# Patient Record
Sex: MALE | Race: WHITE | NOT HISPANIC OR LATINO | Employment: OTHER | URBAN - METROPOLITAN AREA
[De-identification: names, ages, dates, MRNs, and addresses within clinical notes are randomized per-mention and may not be internally consistent; named-entity substitution may affect disease eponyms.]

---

## 2020-01-10 ENCOUNTER — OFFICE VISIT (OUTPATIENT)
Dept: SURGERY | Facility: CLINIC | Age: 75
End: 2020-01-10
Payer: MEDICARE

## 2020-01-10 VITALS
HEIGHT: 66 IN | HEART RATE: 72 BPM | DIASTOLIC BLOOD PRESSURE: 84 MMHG | BODY MASS INDEX: 28.09 KG/M2 | WEIGHT: 174.81 LBS | SYSTOLIC BLOOD PRESSURE: 157 MMHG

## 2020-01-10 DIAGNOSIS — K62.89 RECTAL OR ANAL PAIN: Primary | ICD-10-CM

## 2020-01-10 PROCEDURE — 1101F PT FALLS ASSESS-DOCD LE1/YR: CPT | Mod: CPTII,S$GLB,, | Performed by: COLON & RECTAL SURGERY

## 2020-01-10 PROCEDURE — 99203 OFFICE O/P NEW LOW 30 MIN: CPT | Mod: 25,S$GLB,, | Performed by: COLON & RECTAL SURGERY

## 2020-01-10 PROCEDURE — 99999 PR PBB SHADOW E&M-NEW PATIENT-LVL III: CPT | Mod: PBBFAC,,, | Performed by: COLON & RECTAL SURGERY

## 2020-01-10 PROCEDURE — 99999 PR PBB SHADOW E&M-NEW PATIENT-LVL III: ICD-10-PCS | Mod: PBBFAC,,, | Performed by: COLON & RECTAL SURGERY

## 2020-01-10 PROCEDURE — 1125F AMNT PAIN NOTED PAIN PRSNT: CPT | Mod: S$GLB,,, | Performed by: COLON & RECTAL SURGERY

## 2020-01-10 PROCEDURE — 1159F MED LIST DOCD IN RCRD: CPT | Mod: S$GLB,,, | Performed by: COLON & RECTAL SURGERY

## 2020-01-10 PROCEDURE — 1101F PR PT FALLS ASSESS DOC 0-1 FALLS W/OUT INJ PAST YR: ICD-10-PCS | Mod: CPTII,S$GLB,, | Performed by: COLON & RECTAL SURGERY

## 2020-01-10 PROCEDURE — 1125F PR PAIN SEVERITY QUANTIFIED, PAIN PRESENT: ICD-10-PCS | Mod: S$GLB,,, | Performed by: COLON & RECTAL SURGERY

## 2020-01-10 PROCEDURE — 1159F PR MEDICATION LIST DOCUMENTED IN MEDICAL RECORD: ICD-10-PCS | Mod: S$GLB,,, | Performed by: COLON & RECTAL SURGERY

## 2020-01-10 PROCEDURE — 99203 PR OFFICE/OUTPT VISIT, NEW, LEVL III, 30-44 MIN: ICD-10-PCS | Mod: 25,S$GLB,, | Performed by: COLON & RECTAL SURGERY

## 2020-01-10 NOTE — PROGRESS NOTES
"CRS Office Visit History and Physical    Referring Md:   Chi Paris Md  [address] [address]    SUBJECTIVE:     Chief Complaint: Anal pain/burning    History of Present Illness:  Patient is a 74 y.o. male who presents for evaluation of anal pain/burning.  He states this started approximately 2 years ago.  He describes the sensation to be as if he "just wiped with sandpaper."  This pain is exacerbated by sitting and relieved by standing.  Additionally, the pain only tends to occur on days that he has bowel movements.    The patient states he has a bowel movement approximately every other day to twice per day.  The BMs are formed and usually log-like.  He does not describe sticky stool.  He typically cleans using a bidet rather than wiping.  If he does wipe he uses a plain wet wipe.  In the past he has used hydrocortisone cream and balneol without relief.    The patient reports no blood nor prolapse of tissue.    He tries to eat a high fiber diet but he does not take any fiber supplement.    The patient was seen by Dr. Ma who has previously evaluated him.  He states he was placed on gabapentin which helped some, but did not entirely relieve his symptoms.    Additionally, he has osteoarthritis and a history of back pain for which he has undergone MRI to evaluate for neurologic etiology of his pain.    Last Colonoscopy: Reported to be normal.  He reports he has colonoscopies approximately every 3 years with his family history of colon cancer.      PMH: Osteoarthritis    PSH: No abdominal nor anorectal surgeries    Review of patient's allergies indicates:   Allergen Reactions    Sulfa (sulfonamide antibiotics)        Current Outpatient Medications on File Prior to Visit   Medication Sig Dispense Refill    allopurinol (ZYLOPRIM) 300 MG tablet Take 300 mg by mouth once daily.      amoxicillin (AMOXIL) 500 MG capsule       atorvastatin (LIPITOR) 40 MG tablet       citric " "acid-potassium citrate (POLYCITRA) 1,100-334 mg/5 mL solution Take by mouth 3 (three) times daily with meals.      diazepam (VALIUM) 2 MG tablet Take 2 mg by mouth every 6 (six) hours as needed.      FLUVIRIN [id number] 45 mcg (15 mcg x 3)/0.5 mL Susp       LEVITRA 20 mg tablet       levothyroxine (SYNTHROID, LEVOTHROID) 175 MCG tablet Take 175 mcg by mouth once daily.      potassium citrate (UROCIT-K) 10 mEq TbSR       rosuvastatin (CRESTOR) 10 MG tablet Take 10 mg by mouth once daily.      TADALAFIL (CIALIS ORAL) Take by mouth.      tamsulosin (FLOMAX) 0.4 mg Cp24 Take 0.4 mg by mouth once daily.      testosterone (AXIRON) 30 mg/1.5 mL /actuation SlPm Place onto the skin.      VIAGRA 100 mg tablet        No current facility-administered medications on file prior to visit.        History reviewed. No pertinent family history.    Social History     Tobacco Use    Smoking status: Never Smoker   Substance Use Topics    Alcohol use: Not on file    Drug use: Not on file        Review of Systems:  ROS:  GENERAL: No fever, chills, fatigability or weight loss.  Integument:  No rashes, redness, icterus  CHEST: Denies ZELAYA, cyanosis, wheezing, cough and sputum production.  CARDIOVASCULAR: Denies chest pain, PND, orthopnea or reduced exercise tolerance.  GI:  Denies abd pain, dysphagia, nausea, vomiting, no hematemesis, no rectal pain  : Denies burning on urination, no hematuria, no bacteriuria  MSK:  No deformities, swelling, joint pain swelling  Neurologic:  No HAs, seizures, weakness, paresthesias, gait problems      OBJECTIVE:     Vital Signs (Most Recent)  BP (!) 157/84 (BP Location: Right arm, Patient Position: Sitting, BP Method: Large (Automatic))   Pulse 72   Ht 5' 6" (1.676 m)   Wt 79.3 kg (174 lb 13.2 oz)   BMI 28.22 kg/m²     Physical Exam:  General: White male in no distress   Skin/ Sclera anicteric  HEENT: anicteric, normocephalic, extraocular movements intact   Neck trachea midline  Chest " symmetric, nl excursions, no retractions  Respiratory: respirations are even and unlabored  COR RRR   Abdomen - inspection   soft NT ND.  no masses, No  Organomegaly    Anorectal:   Inspection: small amount of residual stool on the anoderm.  JOSE:  normal tone, no masses, Tenderness to palpation at approximately the level of the dentate.  No tenderness proximally at the pelvic floor.  Extremities: Warm dry and intact.  NO CCE  Neuro: alert and oriented x 4.  Moves all extremities.         ASSESSMENT/PLAN:   1. Chronic anorectal pain    The patient has atypical chronic anorectal pain without clear etiology. No obvious anatomic abnormality on examination but he does have reproducible pain with palpation distally at approximately the level of the dentate.  His pain is not consistent with spinal related pain.  Potential differential includes skin versus muscle related pain.  Recommend empiric pelvic physical therapy and skin treatment with Calmoseptine and high fiber diet to improve stool character and rectal evacuation.  Residual stool debris on anoderm is suggestive of incomplete rectal evacuation with small amount of leakage throughout the day that may be irritating the skin.      Recommend  - Referral to pelvic physical therapy  - High fiber diet and fiber supplement  - Calmoseptine cream    Jose Guadalupe Serrano MD  Colon and Rectal Surgery Fellow

## 2020-01-10 NOTE — LETTER
January 10, 2020      Chi Paris MD  [address]  [address]  [facility] [address]           [facility]  [address]  [facility] [address]  Phone: [phone]          Patient: Brooks Joya   MR Number: [id number]   YOB: 1945   Date of Visit: 1/10/2020       Dear Dr. Chi Paris:    Thank you for referring Brooks Joya to me for evaluation. Attached you will find relevant portions of my assessment and plan of care.    If you have questions, please do not hesitate to call me. I look forward to following Brooks Joya along with you.    Sincerely,    Kleber Cedeno MD    Enclosure  CC:  No Recipients    If you would like to receive this communication electronically, please contact [email or web address] or [phone] to request more information on [employer] Link access.    For providers and/or their staff who would like to refer a patient to [facility], please contact us through our one-stop-shop provider referral line, [facility], at [phone].    If you feel you have received this communication in error or would no longer like to receive these types of communications, please e-mail [email or web address]

## 2020-02-19 ENCOUNTER — CLINICAL SUPPORT (OUTPATIENT)
Dept: REHABILITATION | Facility: HOSPITAL | Age: 75
End: 2020-02-19
Attending: COLON & RECTAL SURGERY
Payer: MEDICARE

## 2020-02-19 DIAGNOSIS — M62.9 DISORDER OF MUSCLE, LIGAMENT, AND FASCIA: ICD-10-CM

## 2020-02-19 DIAGNOSIS — M24.20 DISORDER OF MUSCLE, LIGAMENT, AND FASCIA: ICD-10-CM

## 2020-02-19 PROCEDURE — 97110 THERAPEUTIC EXERCISES: CPT | Mod: PO

## 2020-02-19 PROCEDURE — 97162 PT EVAL MOD COMPLEX 30 MIN: CPT | Mod: PO

## 2020-02-19 NOTE — PLAN OF CARE
"  OUTPATIENT PHYSICAL THERAPY EVALUATION        Patient: Brooks Velasquez [facility] #:  [id number]    Date of treatment: 02/19/2020   Time in: 10:04  Time out: 11:10  Billable time: 60 minutes  # Visits: 1/6  Auth: exp 3/4/2020  POC expiration: 5/19/2020      HISTORY      Brooks is a 74 y.o. male evaluated on 02/19/2020    Physician:  Kleber Cedeno MD   Diagnosis:   Encounter Diagnosis   Name Primary?    Disorder of muscle, ligament, and fascia       Treatment ordered: Physical Therapy  Medical History: No past medical history on file.   Surgical History: No past surgical history on file.   Medications:   Current Outpatient Medications   Medication Sig    allopurinol (ZYLOPRIM) 300 MG tablet Take 300 mg by mouth once daily.    amoxicillin (AMOXIL) 500 MG capsule     atorvastatin (LIPITOR) 40 MG tablet     citric acid-potassium citrate (POLYCITRA) 1,100-334 mg/5 mL solution Take by mouth 3 (three) times daily with meals.    diazepam (VALIUM) 2 MG tablet Take 2 mg by mouth every 6 (six) hours as needed.    FLUVIRIN [id number] 45 mcg (15 mcg x 3)/0.5 mL Susp     LEVITRA 20 mg tablet     levothyroxine (SYNTHROID, LEVOTHROID) 175 MCG tablet Take 175 mcg by mouth once daily.    potassium citrate (UROCIT-K) 10 mEq TbSR     rosuvastatin (CRESTOR) 10 MG tablet Take 10 mg by mouth once daily.    TADALAFIL (CIALIS ORAL) Take by mouth.    tamsulosin (FLOMAX) 0.4 mg Cp24 Take 0.4 mg by mouth once daily.    testosterone (AXIRON) 30 mg/1.5 mL /actuation SlPm Place onto the skin.    VIAGRA 100 mg tablet      No current facility-administered medications for this visit.        Allergies:   Review of patient's allergies indicates:   Allergen Reactions    Sulfa (sulfonamide antibiotics)         Precautions: universal    Bladder/Bowel History: none per pt      SUBJECTIVE     Date of onset: 3 years ago  History of current complaint: pt reports a history of R hip replacement in 2010; also R "knuckle replacements."  Pt " "reports gradual onset of burning sensation to the anus, mainly after having a BM.  If he doesn't have a BM, he doesn't have a problem.  Symptoms resolve overnight.  The symptoms do not radiate.  He notes that his lower spine is a "mess."  He has a history of back pain radiating down the L leg about 20 years ago.  Reports that his back is stiff.  He plays golf 3-4 days per week.  Also goes to the gym 3 days per week- cross training on recumbant bike and uses machines.  He doesn't do anything to alleviate the stiffness.  Prolonged sitting after BM increases his pain; standing relieves it.  Pain is not happening while he is passing stool- only after, and when he sits afterward.  Last BM was yesterday.    Patient's goals for therapy: to be able to perform ADL's and have BM's without pain.    Pain: Patient reports 0/10, with 0 being the lowest and 10 being the highest.    Activities that cause symptoms: defecating and then sitting    Previous treatment included topicals- he cannot recall what he used.  Tried a lidocaine OTC cream and tried that.      Sexually active? No    Frequency of urination:   Daytime: 4-5 times           Nighttime: 1 (wakes to urge)    Difficulty initiating urine stream: No  Urine stream: strong  Complete emptying: Yes  Bladder leakage: No    Frequency of bowel movements: once a day  Difficulty initiating BM: No  Quality/Shape of BM: Angora Stool Chart 3-4  Colon leakage: none    Types of fluid intake: 3 cups of coffee;  2-3 flavored dunn with electrolytes; iced tea, no soda, occ beer  Diet:TAD  Current exercise:see above    Occupation: Pt is a retired     OBJECTIVE     ORTHO SCREEN  Posture: WNL  Pelvic alignment: no sign of deviations noted in supine    ABDOMINALS  Scarring: none  Diastasis: none   Abdominal strength: Rectus: 3/5     TA: poorly isolated but palpation contraction  Chaperone: declined  Consent signed: Yes      RECTAL PELVIC FLOOR EXAM  EXTERNAL " ASSESSMENT  Anus: WNL  Skin condition: WNL   Scarring: none  Sensation: WNL   Pain:  none  Voluntary contraction: visible lift  Voluntary relaxation: visible drop  Involuntary contraction: visible lift  Bearing down: bulge  Anal [place]: intact  Discharge: none       INTERNAL ASSESSMENT  EAS tone: hypertonic   Impaction: none   Pain: tender areas noted as follows: L OI tender to palpation, and recreated symptoms.  Pressure to R and L levators also painful  Sensation: able to localize pressure appropriately   Muscle Bulk: hypertonus   Muscle Power: 2+/5      Quality of contraction: slow relaxation   Specificity: WNL  Coordination: tends to hold breath during PFM contraction     SEMG EVALUATION: Deferred due to time constraints    TREATMENT      Therapeutic Exercise to develop coordination for 10 minutes including: diaphragmatic breathing    Education: instructed on general anatomy/physiology of urinary/bowel system; discussed plan of care with patient; instructed in benefits/risks of treatment; instructed in alternative methods of treatment; instructed in risks of refusing treatment; patient agreed to treatment plan.     Also educated in: body mechanics and use of cut out cushion (Theraseat) for perineal offloading    ASSESSMENT      This is a 74 y.o. male referred to outpatient physical therapy and presents with a medical diagnosis of rectal pain. Patient will benefit from skilled physical therapy to decrease tension on the nerves of the perineum during sitting and defecation to decrease anal discomfort during ADL's.    Educational/Spiritual/Cultural needs: none  Abuse/Neglect: no signs  Nutritional Status: WDWN   Fall Risk: none    Pt's spiritual, cultural and educational needs considered and pt agreeable to plan of care and goals as stated below:     Medical necessity is demonstrated by the following IMPAIRMENTS/PROBLEMS     History  Co-morbidities and personal factors that may impact the plan of care  Examination  Body Structures and Functions, activity limitations and participation restrictions that may impact the plan of care Clinical Presentation   Decision Making/ Complexity Score   Co-morbidities:   S/p R ELGIN              Personal Factors:    Body Regions/Systems/Functions:    poor knowledge of body mechanics and posture, pelvic floor tenderness and increased tension of the pelvic muscles           Activity limitations:   Barriers to Learning: none  Environmental Barriers: none noted    Participation Restrictions:   Pt cannot sit for long periods after having a BM due to pain.        evolving moderate           PLAN    Frequency: once per 1-2 weeks  Duration: 12 weeks    Long Term Goals: 12 weeks   Pt will report improved ability to tolerate sitting > or = 45 minutes with < or = 2/10 pain for improved ability to complete ADL's.   Pt will report < or = 2/10 pain with urination/defecation 80% of the time.   Pt/family will be independent with HEP for continued self-management of symptoms.    Physical therapy will include: therapeutic exercises, neuromuscular re-education, manual therapy, modalities PRN and patient/family education; dry needling as indicated.      Therapist: Kamryn Rivera, PT, BCB-PMD  2/19/2020

## 2020-02-19 NOTE — PATIENT INSTRUCTIONS
Home Exercise Program: 02/19/2020    DIAPHRAGMATIC BREATHING     The diaphragm is a dome shaped muscle that forms the floor of the rib cage.  The correct use of diaphragmatic breathing can help to quiet brain activity resulting in the relaxation of all the muscles and organs of the body. This is accomplished by slow rhythmic breathing concentrated in the diaphragm muscle rather than the chest.    How to do proper relaxation breathing:     Start by lying on your back or reclining in a chair in a relaxed position. Place one hand on your chest and the other on your abdomen.   Relax your jaw by placing your tongue on the roof of your mouth and keeping your teeth slightly apart.    Take a deep breath in, letting the abdomen expand and rise while you keep your upper chest, neck and shoulders relaxed.    As you breathe out, allow your abdomen and chest to fall. Exhale completely.   It doesn't matter if you breathe in/out through your nose and/or mouth. Do whichever feels comfortable.   Remember to breathe slowly.  Do not force your breathing. Do not hold your breath.   Repeat for 5 minutes every day, especially when you are hurting and sore.          [email or web address] for Theraseat cushion.

## 2020-03-10 ENCOUNTER — CLINICAL SUPPORT (OUTPATIENT)
Dept: REHABILITATION | Facility: HOSPITAL | Age: 75
End: 2020-03-10
Payer: MEDICARE

## 2020-03-10 DIAGNOSIS — M62.9 DISORDER OF MUSCLE, LIGAMENT, AND FASCIA: ICD-10-CM

## 2020-03-10 DIAGNOSIS — M24.20 DISORDER OF MUSCLE, LIGAMENT, AND FASCIA: ICD-10-CM

## 2020-03-10 PROCEDURE — 97110 THERAPEUTIC EXERCISES: CPT

## 2020-03-10 PROCEDURE — 97140 MANUAL THERAPY 1/> REGIONS: CPT

## 2020-03-10 NOTE — PROGRESS NOTES
OUTPATIENT PHYSICAL THERAPY DAILY NOTE       Patient: Brooks Joya   [facility] #:  [id number]    Diagnosis:   Encounter Diagnosis   Name Primary?    Disorder of muscle, ligament, and fascia       Date of treatment: 03/10/2020     Time in: 2:52  Time out: 3:35  Billable time: 40 minutes  Visit #: 2/12  Auth: exp 3/10/2021  POC expiration: 5/19/2020    SUBJECTIVE   Pt reports: that he has been practicing his breathing.    Pain: pt did not quantify pain on this date      OBJECTIVE     Therapeutic Exercise to develop  flexibility and pelvic muscle lengthening for 10 minutes including:   adapted child's pose, supported yoga squat, adapted happy baby pose.  He was instructed to incorporate DB with these poses.    Manual Therapy to develop pelvic muscle lengthening and downtraining for 30 minutes including:ischemic pressure and contract/relax to B levator ani (pt positioned in supine hooklying), MFR to the perineal body, ischemic pressure to B OI's.  OI's were not tender or symptom provoking on this date.  Pt did struggle to keep from shortening the levators but was able to utilize DB to achieve drop when contracted.  Passive shortening of the EAS was also incorporated (with indirect stacking).  Pt was instructed to monitor his symptoms for the next 24 hours and to report back next session.      Education: Discussed progression of plan of care with patient; educated pt in activity modification; reviewed HEP with pt. Pt demonstrated and verbalized understanding of all instruction and was provided with a handout of HEP (see Patient Instructions).      ASSESSMENT      Pt tolerated entire treatment well with no visible adverse effects. Will see what effect today's treatment will have.  He reported leaving the clinic feeling better than when he arrived.    Will the patient continue to benefit from skilled PT intervention? Yes  Medical necessity demonstrated by: Skilled PT supervision required for HEP and treatment  progression  Progress towards goals:  good  New/Revised goals: none      PLAN     Continue with established Plan of Care, working toward established PT goals.

## 2020-03-10 NOTE — PATIENT INSTRUCTIONS
Home Program: 03/10/2020    YOGA POSES   to improve pelvic floor muscle DROP.  Maintain each position for 1 minute, 1-2 times per day.       Deep Squat    Happy Baby            Child's Pose

## 2020-03-19 ENCOUNTER — TELEPHONE (OUTPATIENT)
Dept: REHABILITATION | Facility: HOSPITAL | Age: 75
End: 2020-03-19

## 2020-03-19 NOTE — TELEPHONE ENCOUNTER
Pt was contacted by phone to inform them of [facility] PT being suspended due to Covid-19 concerns.  Message was left with department phone number to call back for home program instructions and with any further questions.

## 2020-03-24 ENCOUNTER — TELEPHONE (OUTPATIENT)
Dept: REHABILITATION | Facility: HOSPITAL | Age: 75
End: 2020-03-24

## 2020-03-24 NOTE — TELEPHONE ENCOUNTER
3/24/2020    Spoke with patient by phone today- he reports continued symptoms with short term relief after last session.  We discussed his home program- I advised him to perform his yoga postures in the evening or after activity rather than in the morning.  We also spoke about valium suppositories and he was advised to consult Dr. Cedeno about these.  Pt was advised to call back with any additional questions in the interim between visits.  Will contact patient when we are able to schedule and provide pelvic PT services.

## 2020-03-27 RX ORDER — GABAPENTIN 300 MG/1
300 CAPSULE ORAL 3 TIMES DAILY
Qty: 90 CAPSULE | Refills: 11 | Status: SHIPPED | OUTPATIENT
Start: 2020-03-27 | End: 2021-04-28

## 2020-04-23 ENCOUNTER — PATIENT MESSAGE (OUTPATIENT)
Dept: REHABILITATION | Facility: HOSPITAL | Age: 75
End: 2020-04-23

## 2020-05-06 ENCOUNTER — CLINICAL SUPPORT (OUTPATIENT)
Dept: REHABILITATION | Facility: HOSPITAL | Age: 75
End: 2020-05-06
Attending: COLON & RECTAL SURGERY
Payer: MEDICARE

## 2020-05-06 DIAGNOSIS — M62.9 DISORDER OF MUSCLE, LIGAMENT, AND FASCIA: ICD-10-CM

## 2020-05-06 DIAGNOSIS — M24.20 DISORDER OF MUSCLE, LIGAMENT, AND FASCIA: ICD-10-CM

## 2020-05-06 PROCEDURE — 97140 MANUAL THERAPY 1/> REGIONS: CPT | Mod: PO

## 2020-05-06 PROCEDURE — 97112 NEUROMUSCULAR REEDUCATION: CPT | Mod: PO

## 2020-05-06 NOTE — PROGRESS NOTES
OUTPATIENT PHYSICAL THERAPY DAILY NOTE       Patient: Brooks Velasquez [facility] #:  [id number]    Diagnosis:   Encounter Diagnosis   Name Primary?    Disorder of muscle, ligament, and fascia       Date of treatment: 05/06/2020     Time in: 10:03  Time out:  11:00  Billable time:55 minutes  Visit #: 3/12  Auth: exp 3/10/2021  POC expiration: 5/19/2020    SUBJECTIVE   Pt reports: pt reports that he is still having rectal pain in sitting after BM.  He does feel like his symptoms are moving in the right direction after last session  Pain: 0/10 at start of session.        OBJECTIVE     Neuromuscular Re-education  To develop muscle coordination and downtraining for 10 minutes as follows: Kegels with assist of SEMG, and bearing down with abdominal distention.  We worked in supine and SL DKTC with external lead wires.  He demonstrated normal baseline resting, good WR rise, and fair/good holding in 10 sec Kegels with complete and timely derecruitment.  He did require verbal cues at first to eliminate gluts and quads.  In bearing down, he was able to bulge the perineum and demonstrated lower pelvic muscle activation during bear down.      Manual Therapy to develop pelvic muscle lengthening and downtraining for 45 minutes including:ischemic pressure and contract/relax to B EAS intrarectally (verbal consent given), MFR to the perineal body, connective tissue mobilization and skin rolling to B buttocks and perineum.  He noted that pressure to the EAS created his sensation of pain located on the L EAS at 2:00 position.    Pt was instructed to monitor his symptoms for the next 24 hours and to report back next session.      Education: Discussed progression of plan of care with patient; educated pt in activity modification; reviewed HEP with pt. Pt demonstrated and verbalized understanding of all instruction and was provided with a handout of HEP (see Patient Instructions).      ASSESSMENT      Pt tolerated entire treatment well  with no visible adverse effects. We left the levators alone today as the EAS was more symptom provocative.  Will the patient continue to benefit from skilled PT intervention? Yes  Medical necessity demonstrated by: Skilled PT supervision required for HEP and treatment progression  Progress towards goals:  good  New/Revised goals: none      PLAN     Continue with established Plan of Care, working toward established PT goals.

## 2020-05-14 ENCOUNTER — CLINICAL SUPPORT (OUTPATIENT)
Dept: REHABILITATION | Facility: HOSPITAL | Age: 75
End: 2020-05-14
Attending: INTERNAL MEDICINE
Payer: MEDICARE

## 2020-05-14 DIAGNOSIS — M62.9 DISORDER OF MUSCLE, LIGAMENT, AND FASCIA: ICD-10-CM

## 2020-05-14 DIAGNOSIS — M24.20 DISORDER OF MUSCLE, LIGAMENT, AND FASCIA: ICD-10-CM

## 2020-05-14 PROCEDURE — 97140 MANUAL THERAPY 1/> REGIONS: CPT | Mod: PO

## 2020-05-14 NOTE — PROGRESS NOTES
OUTPATIENT PHYSICAL THERAPY DAILY NOTE       Patient: Brooks Velasquez [facility] #:  [id number]    Diagnosis:   Encounter Diagnosis   Name Primary?    Disorder of muscle, ligament, and fascia       Date of treatment: 05/14/2020     Time in: 2:00  Time out:  3:00  Billable time:55 minutes  Visit #: 4/12  Auth: exp 3/10/2021  POC expiration: 5/19/2020    SUBJECTIVE   Pt reports: pt reports that he is still having rectal pain in sitting after BM.  No issues if he plays golf after BM.  Pain: 0/10 at start of session.  Last BM was yesterday and he didn't have an issue.  He is still doing his yoga postures.        OBJECTIVE     Manual Therapy to develop pelvic muscle lengthening and downtraining for 55 minutes including:ischemic pressure and contract/relax to B EAS intrarectally (verbal consent given), MFR to the perineal body, connective tissue mobilization and skin rolling to B buttocks and perineum.  We discussed AT LENGTH the fact that increased pelvic muscle tension can refer pain to the rectum and groin, and can be a  of pain after BM.  We spoke about passive shortening of the pelvic floor (slouched sitting), and about activities to perform after BM to eliminate the pain that follows passing stool.  We worked on his awareness of tension in the EAS, and when this was called to his attention he was able to actively lengthen the EAS for short periods.   We also discussed his tendency to hold tension in his abdominals, and holding anal tension for fear of losing bowel control.   Please refer to pt. Instructions for revised home program.     Education: Discussed progression of plan of care with patient; educated pt in activity modification; reviewed HEP with pt. Pt demonstrated and verbalized understanding of all instruction and was provided with a handout of HEP (see Patient Instructions).      ASSESSMENT      Pt tolerated entire treatment well with no visible adverse effects. We seem to have had a serious aha  moment- will hopefully be able to give him some control of his muscle tension, and pain.  Dry needling may be still on the table as well.    Will the patient continue to benefit from skilled PT intervention? Yes  Medical necessity demonstrated by: Skilled PT supervision required for HEP and treatment progression  Progress towards goals:  good  New/Revised goals: none      PLAN     Continue with established Plan of Care, working toward established PT goals.

## 2020-05-14 NOTE — PATIENT INSTRUCTIONS
After BM, if you can't play golf, try these 2 strategies:    1. Cat cow        On hands and knees, exhale and round back up. Inhale and arch back down.  repeat for __10-20__ breaths.  Focus on NOT CLENCHING.     https://yg.[employer].us/46     Copyright © I. All rights reserved.     2. Focus on sitting but NOT CLENCHING.  This involves self awareness, and getting control of both your buttocks and your sphincter muscle.  Remember to LET YOUR BELLY GO TOO!

## 2020-05-21 ENCOUNTER — CLINICAL SUPPORT (OUTPATIENT)
Dept: REHABILITATION | Facility: HOSPITAL | Age: 75
End: 2020-05-21
Attending: COLON & RECTAL SURGERY
Payer: MEDICARE

## 2020-05-21 DIAGNOSIS — M24.20 DISORDER OF MUSCLE, LIGAMENT, AND FASCIA: ICD-10-CM

## 2020-05-21 DIAGNOSIS — M62.9 DISORDER OF MUSCLE, LIGAMENT, AND FASCIA: ICD-10-CM

## 2020-05-21 PROCEDURE — 97140 MANUAL THERAPY 1/> REGIONS: CPT | Mod: PO

## 2020-05-21 NOTE — PATIENT INSTRUCTIONS
"1. Prior to sitting on toilet, spread your "cheeks" to help your muscles open up when you push.    2. Place your feet on a stool so that your knees are above your hips.      3. Take a deep breath in and let belly expand.    4. Breathe out and push anal muscles down and belly out.  Blow out through your mouth to avoid straining.    "

## 2020-05-21 NOTE — PROGRESS NOTES
"  OUTPATIENT PHYSICAL THERAPY DAILY NOTE/ PLAN OF CARE       Patient: Brooks Velasquez [facility] #:  [id number]    Diagnosis:   Encounter Diagnosis   Name Primary?    Disorder of muscle, ligament, and fascia       Date of treatment: 05/21/2020     Time in: 10:02  Time out:  11:00  Billable time:55 minutes  Visit #: 5/12  Auth: exp 3/10/2021  POC expiration: 5/19/2020    SUBJECTIVE   Pt reports: that he is "about the same."  Pain: 0/10 at start of session.  Last BM was this morning.  He is still doing his yoga postures and breathing work.  Trying to release his abdominals more during functional activities.  Continues to have discomfort after BM if not playing golf.        OBJECTIVE     Manual Therapy to develop pelvic muscle lengthening and downtraining for 55 minutes including:ischemic pressure and contract/relax to B EAS intrarectally (verbal consent given), connective tissue mobilization and skin rolling to B buttocks and perineum.  We discussed making changes to how he positions himself and engages muscles to defecate.  He was encouraged to place a stool to improve his anorectal angle during evacuation.   Please refer to pt. Instructions for revised home program.     Pt also rec'd FDN to the EAS provided by Ashley Holstein, PT.  Consents signed prior.        Education: Discussed progression of plan of care with patient; educated pt in activity modification; reviewed HEP with pt. Pt demonstrated and verbalized understanding of all instruction and was provided with a handout of HEP (see Patient Instructions).      ASSESSMENT      Pt tolerated entire treatment well with no visible adverse effects. Will assess the effect of dry needling next session and repeat or progress as indicated.   Will the patient continue to benefit from skilled PT intervention? Yes  Medical necessity demonstrated by: Skilled PT supervision required for HEP and treatment progression  Progress towards goals:  good  New/Revised goals:   STG: (1 " week) 1. Pt to be I with proper body mechanics for defecation.    LTG: (6 weeks) Pt will report improved ability to tolerate sitting > or = 45 minutes with < or = 2/10 pain for improved ability to complete ADL's. PROGRESSING  Pt will report < or = 2/10 pain with urination/defecation 80% of the time. MET  Pt/family will be independent with HEP for continued self-management of symptoms.PROGRESSING      PLAN     PT once per week for 6 weeks.  Physical therapy will include: therapeutic exercises, neuromuscular re-education, manual therapy, modalities PRN and patient/family education; dry needling as indicated

## 2020-05-25 ENCOUNTER — PATIENT MESSAGE (OUTPATIENT)
Dept: REHABILITATION | Facility: HOSPITAL | Age: 75
End: 2020-05-25

## 2020-05-25 NOTE — PROGRESS NOTES
Patient provided written and verbal consent to receive functional dry needling at today's visit (see consent form scanned into chart). FDN performed to external anal sphincter. FDN performed to reduce pain and muscle tension, promote blood flow, and improve ROM and function x 5 minutes (no charge). Pt tolerated tx well without adverse effects. He was educated on what to expect following the procedure and he verbalized understanding.

## 2020-05-28 ENCOUNTER — CLINICAL SUPPORT (OUTPATIENT)
Dept: REHABILITATION | Facility: HOSPITAL | Age: 75
End: 2020-05-28
Attending: COLON & RECTAL SURGERY
Payer: MEDICARE

## 2020-05-28 DIAGNOSIS — M62.9 DISORDER OF MUSCLE, LIGAMENT, AND FASCIA: ICD-10-CM

## 2020-05-28 DIAGNOSIS — M24.20 DISORDER OF MUSCLE, LIGAMENT, AND FASCIA: ICD-10-CM

## 2020-05-28 PROCEDURE — 97140 MANUAL THERAPY 1/> REGIONS: CPT | Mod: PO

## 2020-05-28 NOTE — PROGRESS NOTES
OUTPATIENT PHYSICAL THERAPY DAILY NOTE       Patient: Brooks Velasquez [facility] #:  [id number]    Diagnosis:   Encounter Diagnosis   Name Primary?    Disorder of muscle, ligament, and fascia       Date of treatment: 05/28/2020     Time in: 8:05  Time out: 8:55  Billable time: 45 minutes  Visit #: 6/12  Auth: exp 3/10/2021  POC expiration: 6/21/2020    SUBJECTIVE   Pt reports: pt reports relief of symptoms the day of and the next day after last session.  He thinks that if he could have a BM in the evening before bed he could have more manageable symptoms, as sleep often relieves his discomfort.    Pain: 0/10 at start of session.      OBJECTIVE     Manual Therapy to develop pelvic muscle lengthening and downtraining for 45 minutes including:ischemic pressure and contract/relax to B EAS intrarectally (verbal consent given), connective tissue mobilization and skin rolling to B buttocks and perineum.  We worked on proper diaphragmatic breathing, as he tended to bear down a little when doing this.  He achieved fairly good tissue excursion with just normal deep breathing.       Pt also rec'd FDN to the EAS provided by Ashley Holstein, PT.  Verbal consent given prior.       Education: Discussed progression of plan of care with patient; educated pt in activity modification; reviewed HEP with pt. Pt demonstrated and verbalized understanding of all instruction.    ASSESSMENT      Pt tolerated entire treatment well with no visible adverse effects. Pt reports some relief after needling and manual therapy.   Will the patient continue to benefit from skilled PT intervention? Yes  Medical necessity demonstrated by: Skilled PT supervision required for HEP and treatment progression  Progress towards goals:  good  New/Revised goals:   STG: (1 week) 1. Pt to be I with proper body mechanics for defecation.    LTG: (6 weeks) Pt will report improved ability to tolerate sitting > or = 45 minutes with < or = 2/10 pain for improved ability  to complete ADL's. PROGRESSING  Pt will report < or = 2/10 pain with urination/defecation 80% of the time. MET  Pt/family will be independent with HEP for continued self-management of symptoms.PROGRESSING      PLAN     PT once per week for 6 weeks.  Physical therapy will include: therapeutic exercises, neuromuscular re-education, manual therapy, modalities PRN and patient/family education; dry needling as indicated

## 2020-06-04 ENCOUNTER — CLINICAL SUPPORT (OUTPATIENT)
Dept: REHABILITATION | Facility: HOSPITAL | Age: 75
End: 2020-06-04
Attending: COLON & RECTAL SURGERY
Payer: MEDICARE

## 2020-06-04 DIAGNOSIS — M24.20 DISORDER OF MUSCLE, LIGAMENT, AND FASCIA: ICD-10-CM

## 2020-06-04 DIAGNOSIS — M62.9 DISORDER OF MUSCLE, LIGAMENT, AND FASCIA: ICD-10-CM

## 2020-06-04 PROCEDURE — 97140 MANUAL THERAPY 1/> REGIONS: CPT | Mod: PO

## 2020-06-04 NOTE — PROGRESS NOTES
OUTPATIENT PHYSICAL THERAPY DAILY NOTE       Patient: Brooks Velasquez [facility] #:  [id number]    Diagnosis:   Encounter Diagnosis   Name Primary?    Disorder of muscle, ligament, and fascia       Date of treatment: 06/04/2020     Time in: 10:04  Time out: 10:59  Billable time: 55 minutes  Visit #: 7/12  Auth: exp 3/10/2021  POC expiration: 6/21/2020    SUBJECTIVE   Pt reports:  Pt cannot remember if he had relief after last session.  Pain still tied to BM.  Has not ordered a foot stool to elevate for BM's due to his leaving town next week.     Pain: 3-4/10 at start of session.      OBJECTIVE     Manual Therapy to develop pelvic muscle lengthening and downtraining for 55 minutes including:ischemic pressure and contract/relax to B EAS intrarectally (verbal consent given), connective tissue mobilization along the rectal sidewalls near the obturator internus, connective tissue mobilization and skin rolling to B buttocks and perineum.  B OI's were palpated and noted to be non-tender on this date.   We worked on proper diaphragmatic breathing, as he tended to bear down a little when doing this.  He achieved fairly good tissue excursion with just normal deep breathing.   Symptoms were reproduced mainly with movement of the exam finger, not with palpation.      Education: Discussed progression of plan of care with patient; educated pt in activity modification; reviewed HEP with pt. Pt demonstrated and verbalized understanding of all instruction.    ASSESSMENT      Pt tolerated entire treatment well with no visible adverse effects. Pt may ultimately benefit from a pain management consult.   Will the patient continue to benefit from skilled PT intervention? Yes  Medical necessity demonstrated by: Skilled PT supervision required for HEP and treatment progression  Progress towards goals:  good  New/Revised goals:   STG: (1 week) 1. Pt to be I with proper body mechanics for defecation. MET    LTG: (6 weeks) Pt will report  improved ability to tolerate sitting > or = 45 minutes with < or = 2/10 pain for improved ability to complete ADL's. PROGRESSING  Pt will report < or = 2/10 pain with urination/defecation 80% of the time. MET  Pt/family will be independent with HEP for continued self-management of symptoms.PROGRESSING      PLAN     PT once per week for 6 weeks.  Physical therapy will include: therapeutic exercises, neuromuscular re-education, manual therapy, modalities PRN and patient/family education; dry needling as indicated

## 2020-06-11 ENCOUNTER — CLINICAL SUPPORT (OUTPATIENT)
Dept: REHABILITATION | Facility: HOSPITAL | Age: 75
End: 2020-06-11
Attending: COLON & RECTAL SURGERY
Payer: MEDICARE

## 2020-06-11 DIAGNOSIS — M24.20 DISORDER OF MUSCLE, LIGAMENT, AND FASCIA: ICD-10-CM

## 2020-06-11 DIAGNOSIS — M62.9 DISORDER OF MUSCLE, LIGAMENT, AND FASCIA: ICD-10-CM

## 2020-06-11 PROCEDURE — 97140 MANUAL THERAPY 1/> REGIONS: CPT | Mod: PO

## 2020-06-11 NOTE — PROGRESS NOTES
OUTPATIENT PHYSICAL THERAPY DAILY NOTE       Patient: Brooks Velasquez [facility] #:  [id number]    Diagnosis:   Encounter Diagnosis   Name Primary?    Disorder of muscle, ligament, and fascia       Date of treatment: 06/11/2020     Time in: 10:04  Time out: 10:59  Billable time: 55 minutes  Visit #: 8/12  Auth: exp 3/10/2021  POC expiration: 6/21/2020    SUBJECTIVE   Pt reports:  Pt with no new c/o.       Pain: 0/10 at start of session.      OBJECTIVE     Manual Therapy to develop pelvic muscle lengthening and downtraining for 55 minutes including:ischemic pressure and contract/relax to B EAS intrarectally (verbal consent given), connective tissue mobilization along the rectal sidewalls near the obturator internus.  B OI's were palpated and noted to be non-tender on this date.   We worked on proper diaphragmatic breathing, as he tended to bear down a little when doing this.  We reviewed his home program, including yoga postures for drop, DB, and proper defecation technique.      Education: see above, and pt instructions section    ASSESSMENT      Pt is I with his HEP for self mgmt of symptoms.  No further need for PT services at this time.  We spoke again about a pain mgmt referral and possible nerve blocks as a solution.    Goals:   STG: (1 week) 1. Pt to be I with proper body mechanics for defecation. MET    LTG: (6 weeks) Pt will report improved ability to tolerate sitting > or = 45 minutes with < or = 2/10 pain for improved ability to complete ADL's. NOT MET  Pt will report < or = 2/10 pain with urination/defecation 80% of the time. MET  Pt/family will be independent with HEP for continued self-management of symptoms.MET      PLAN     D/c PT

## 2020-06-11 NOTE — PATIENT INSTRUCTIONS
"Home Exercise Program: 06/11/2020    DIAPHRAGMATIC BREATHING     The diaphragm is a dome shaped muscle that forms the floor of the rib cage.  The correct use of diaphragmatic breathing can help to quiet brain activity resulting in the relaxation of all the muscles and organs of the body. This is accomplished by slow rhythmic breathing concentrated in the diaphragm muscle rather than the chest.    How to do proper relaxation breathing:     Start by lying on your back or reclining in a chair in a relaxed position. Place one hand on your chest and the other on your abdomen.   Relax your jaw by placing your tongue on the roof of your mouth and keeping your teeth slightly apart.    Take a deep breath in, letting the abdomen expand and rise while you keep your upper chest, neck and shoulders relaxed.    As you breathe out, allow your abdomen and chest to fall. Exhale completely.   It doesn't matter if you breathe in/out through your nose and/or mouth. Do whichever feels comfortable.   Remember to breathe slowly.  Do not force your breathing. Do not hold your breath.   Repeat for 5 minutes every day, especially when you are hurting and sore.          [email or web address] for Theraseat cushion.      Home Program: 06/11/2020    YOGA POSES   to improve pelvic floor muscle DROP.  Maintain each position for 1 minute, 1-2 times per day.       Deep Squat    Happy Baby            Child's Pose    After BM, if you can't play golf, try these 2 strategies:    1. Cat cow        On hands and knees, exhale and round back up. Inhale and arch back down.  repeat for __10-20__ breaths.  Focus on NOT CLENCHING.     https://[email or web address].[email or web address]     Copyright © I. All rights reserved.     2. Focus on sitting but NOT CLENCHING.  This involves self awareness, and getting control of both your buttocks and your sphincter muscle.  Remember to LET YOUR BELLY GO TOO!        1. Prior to sitting on toilet, spread your "cheeks" to help your muscles open " up when you push.    2. Place your feet on a stool so that your knees are above your hips.      3. Take a deep breath in and let belly expand.    4. Breathe out and push anal muscles down and belly out.  Blow out through your mouth to avoid straining.

## 2020-10-16 ENCOUNTER — PATIENT MESSAGE (OUTPATIENT)
Dept: REHABILITATION | Facility: HOSPITAL | Age: 75
End: 2020-10-16

## 2020-10-26 ENCOUNTER — PATIENT MESSAGE (OUTPATIENT)
Dept: REHABILITATION | Facility: HOSPITAL | Age: 75
End: 2020-10-26

## 2020-12-11 ENCOUNTER — PATIENT MESSAGE (OUTPATIENT)
Dept: REHABILITATION | Facility: HOSPITAL | Age: 75
End: 2020-12-11

## 2020-12-14 ENCOUNTER — PATIENT MESSAGE (OUTPATIENT)
Dept: REHABILITATION | Facility: HOSPITAL | Age: 75
End: 2020-12-14

## 2020-12-16 ENCOUNTER — CLINICAL SUPPORT (OUTPATIENT)
Dept: REHABILITATION | Facility: HOSPITAL | Age: 75
End: 2020-12-16
Payer: MEDICARE

## 2020-12-16 DIAGNOSIS — H81.91 VESTIBULOPATHY OF RIGHT EAR: ICD-10-CM

## 2020-12-16 DIAGNOSIS — R26.89 IMPAIRMENT OF BALANCE: ICD-10-CM

## 2020-12-16 PROCEDURE — 97162 PT EVAL MOD COMPLEX 30 MIN: CPT | Mod: PO

## 2020-12-16 NOTE — PLAN OF CARE
"[facility]  Physical Therapy Neurological Rehabilitation Initial Evaluation    Name: Brooks Joya  Clinic Number: [id number]    Therapy Diagnosis:   Encounter Diagnoses   Name Primary?    Vestibulopathy of right ear     Impairment of balance      Physician: Hung Puentes MD    Physician Orders: PT Eval and Treat "Vestibular Therapy "  Medical Diagnosis from Referral: R42 (ICD-10-CM) - Dizziness and giddiness H81.4 (ICD-10-CM) - Vertigo of central origin   Evaluation Date: 12/16/2020  Insurance Authorization Period: 12/14/2020 - 12/14/2021   Plan of Care Expiration: 02/15/2021   Visit # / Visits authorized: 01/ 01    Time In: 1015  Time Out: 1110  Total Billable Time: 55 minutes    Precautions: Standard, Bilateral Hearing loss (greater on R side)    Subjective   Date of onset: Summer 2020  History of current condition - Travon reports onset of "wooziness" sensation and imbalance during his vacation time in [place]. He has a longstanding hx of recurrent BPPV, but those episodes usually improve after a few days of taking Valium.   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    Is patient currently taking medication for stated indicators: No  Neurological: None     Medical History:   No past medical history on file.    Surgical History:   Brooks Joya  has no past surgical history on file.    Medications:   Brooks has a current medication list which includes the following prescription(s): " "allopurinol, amoxicillin, atorvastatin, citric acid-potassium citrate, diazepam, fluvirin [id number], gabapentin, levitra, levothyroxine, potassium citrate, rosuvastatin, tadalafil, tamsulosin, testosterone, and viagra.    Allergies:   Review of patient's allergies indicates:   Allergen Reactions    Sulfa (sulfonamide antibiotics)         Imaging, MRI IAC studies: no IAC, CP angle, or intra-labyrinthine masses noted per patient chart from [facility]    Vestibular Function Testing/Audiogram: "Vestibular hypersensitivity and borderline hydropic disease" per patient's chart from [facility]     Prior Therapy: Prior vestibular therapy in [place]  Social History: Lives with spouse   Falls: 0   DME: No    Home Environment: 2 story house   Exercise Routine / History: Golf 3-4 days a week, yard work   Family Present at time of Eval: No   Occupation: Retired  Prior Level of Function: Independent  Current Level of Function: Independent    Pain: L knee pain managed with injections    Pts goals: "reduce or find a way to mitigate wooziness"   Objective   Outcome Measure:   DHI: To be completed    SYSTEMS SCREEN    - Follows commands: 100% of time   - Speech: no deficits     Mental status: alert, oriented to person, place, and time, normal mood, behavior, speech, dress, motor activity, and thought processes  Appearance: Casually dressed  Behavior:  calm and cooperative  Attention Span and Concentration:  Normal    Posture Alignment: WFL     Sensation: Light Touch: Did not formally assess. Patient reports no sensation changes.         Coordination:   - UE coordination: DNT  - LE coordination: DNT    ROM:   CERVICAL SPINE  Flexion: 45 degrees (80-90 deg) (mild symptoms with return to neutral)  Extension: 25 degrees  (70-80 deg)  L side bend: 20 degrees   R side bend: 20 degrees (20-45 degrees)  L rotation: 55 degrees   R rotation: 55 degrees (70-90 degrees)  Are concurrent symptoms present with any of these movements: " "As noted above    if trauma, upper cervical instability testing  Alar Ligament Test: Negative  Sharp Tommie Test: Negative    Modified VAS (Vertebral Artery Screen), in sitting (rotation, then extension):  R: Negative  L: Negative    VESTIBULAR EXAMINATION    Visual/Auditory:   Spontaneous Nystagmus: Absent with fixation present  Ocular ROM: WNL   Tracking/Smooth Pursuits: Intact  Gaze Holding Nystagmus: Present: Right with fixation present  Saccades: Undershooting vertically  Acuity: Wears glasses   Visual field: DNT  Convergence: 15cm  VOR Screen: WNL; slow self selected speed. No retinal slip  VOR Cancellation: WNL    Head Thrust Test: To be assessed  Head Shaking Nystagmus: DNT    POSITIONAL CANAL TESTING: Did not test due to time constraints    Functional Gait Assessment:   1. Gait on level surface =  3  2. Change in Gait Speed = 3  3. Gait with horizontal head turns  = 2  4. Gait with vertical head turns = 2  5. Gait with pivot turns = 3  6. Step over obstacle = 3  7. Gait with Narrow TANK = 3  8. Gait with eyes closed = 2  9. Ambulating Backwards = 3  10. Steps = 1 (step to pattern utilized due to chronic L knee pain)    Score 25/30   Score:   <22/30 fall risk     TOOTIE SENSORY TESTING:  (P= Pass, F= Fail; note any sway; hold each position for 30")  Condition 1: (firm surface/feet together/eyes open) 30  Condition 2: (firm surface/feet together/eyes closed) 30  Condition 3: (firm surface/feet in tandem/eyes open) 30  Condition 4: (firm surface/feet in tandem/eyes closed) 4   Condition 5: (soft surface/feet together/eyes open) 30  Condition 6: (soft surface/feet together/eyes closed) 4  Condition 7: (Fukuda step test), measure distance varied from center starting position, > 30 deg deviation to either side indicates hypofunction of biased side 38 degree deviation to the R    Dynamic Visual Acuity: To be assessed      CMS Impairment/Limitation/Restriction for FOTO Vertigo Survey not performed this date due to " time constraints       TREATMENT   No treatment provided today. All time spent on evaluation.     Home Exercises and Patient Education Provided    Education provided: Examination findings, POC, scheduling, goals of therapy    Written Home Exercises Provided: No. To be established at initial follow up session.     Assessment   Brooks is a 75 y.o. male referred to outpatient Physical Therapy with a medical diagnosis of R42 (ICD-10-CM) - Dizziness and giddiness H81.4 (ICD-10-CM) - Vertigo of central origin.  Objective examination was somewhat limited due to patient's extensive subjective history. Pt presents with signs and symptoms consistent with vestibulopathy, likely of the R side. He exhibited impaired convergence, undershooting with vertical saccades, and mild R-beating nystagmus with R gaze (fixation present). These findings suggest a central component to vestibulopathy. He performed fairly well with dynamic balance testing. With Functional Gait Assessment, he achieved 25/30, exhibiting mild imbalance and reporting increased unsteadiness with the head turning components. With sensory organization static balance testing, he was unable to maintain balance in the test conditions that bias the vestibular system (eyes closed in tandem stance and eyes closed on foam pad). Based on patient's complaints, BPPV is not suspected at this time, but positional testing will be performed at a follow up visit to rule out BPPV. DVA to be performed at a later date as well to objectively assess VOR. Travon will benefit from skilled PT to address stated impairments to decrease dizziness sensation.     Pt prognosis is Good.   Pt will benefit from skilled outpatient Physical Therapy to address the deficits stated above and in the chart below, provide pt/family education, and to maximize pt's level of independence.     Plan of care discussed with patient: Yes   Pt's spiritual, cultural and educational needs considered and patient is  agreeable to the plan of care and goals as stated below:     Anticipated Barriers for therapy: None foreseen    Medical Necessity is demonstrated by the following  History  Co-morbidities and personal factors that may impact the plan of care Co-morbidities:   Bilateral hearing loss with R more involved    Personal Factors:   no deficits     moderate   Examination  Body Structures and Functions, activity limitations and participation restrictions that may impact the plan of care Body Regions:   head  trunk    Body Systems:    balance    Participation Restrictions:   Tolerance to activity    Activity limitations:   Learning and applying knowledge  no deficits    General Tasks and Commands  no deficits    Communication  no deficits    Mobility  walking    Self care  no deficits    Domestic Life  no deficits    Interactions/Relationships  no deficits    Life Areas  no deficits    Community and Social Life  no deficits         moderate   Clinical Presentation evolving clinical presentation with changing clinical characteristics moderate   Decision Making/ Complexity Score: moderate       Short Term Goal = 4 weeks Initial - 12/16/2020 Progress Status   Patient will be independent with HEP emphasizing gaze stabilization and habituation  Education required New goal - Ongoing   Patient will undergo DVA testing with appropriate goal setting to follow To be tested  New goal - Ongoing   Patient will maintain balance in GST condition # 4 for a duration of 10 seconds indicating improved vestibular input/integration for postural control.  4 seconds New goal - Ongoing   Patient will maintain balance in GST condition # 6 for a duration of 15 seconds indicating improved vestibular input/integration for postural control.   4 seconds New goal - Ongoing     Long Term Goal = 8 weeks Initial - 12/16/2020 Progress Status   Patient will exhibit improved Dizziness Handicap Inventory score by 17 points, indicating decreased impact of  dizziness on daily functioning To be given at 1st follow up New goal - Ongoing   Patient will maintain balance in GST condition # 6 for a duration of 30 seconds indicating improved vestibular input/integration for postural control.   4 seconds New goal - Ongoing   Patient will exhibit improved Functional Gait Assessment score to >/= 28 /30 indicating improved dynamic balance for increased safety ambulatory tasks   25/30 New goal - Ongoing       Plan   Plan of care Certification: 12/16/2020 to 02/15/2021.    Outpatient Physical Therapy 2 times weekly for 8 weeks to include the following interventions: Neuromuscular Re-ed, Patient Education, Self Care, Therapeutic Activites, Therapeutic Exercise and Canalith Repositioning Maneuver (as indicated).     Mery Lo, PT

## 2020-12-18 NOTE — PROGRESS NOTES
"  Physical Therapy Daily Treatment Note     Name: Brooks Joya  Clinic Number: [id number]    Therapy Diagnosis:   Encounter Diagnoses   Name Primary?    Vestibulopathy of right ear     Impairment of balance      Physician: Hung Puentes MD    Visit Date: 12/21/2020    Physician Orders: PT Eval and Treat "Vestibular Therapy "  Medical Diagnosis from Referral: R42 (ICD-10-CM) - Dizziness and giddiness H81.4 (ICD-10-CM) - Vertigo of central origin   Evaluation Date: 12/16/2020  Insurance Authorization Period: 12/16/2020 - 12/30/2020    Plan of Care Expiration: 02/15/2021   Visit # / Visits authorized: 02/06     Time In: 0926  Time Out: 1022  Total Billable Time: 56 minutes     Precautions: Standard, Bilateral Hearing loss (greater on R side)    Missed visits: 0  Cancelled visits: 0  Subjective     Pt reports: that "wooziness" sensation is overall improving and he is not having to take the medication as often  HEP initiated today.  Response to previous treatment: No adverse effects reported   Functional change: Ongoing     Pain: 0/10  Location:  N/a    Dizziness: 9/10 "wooziness"     Objective     Travon participated in neuromuscular re-education activities to improve: Balance and Vestibular Function for 45 minutes. The following activities were included:    POSITIONAL CANAL TESTING  Looking for nystagmus (slow phase followed by quick phase to the affected side for BPPV)    [place] (posterior / CL anterior)   Right : Negative nystagmus, Negative dizziness   Left: Negative nystagmus, Negative dizziness  Horizontal Canals   Right: Negative nystagmus, Negative dizziness   Left: Negative nystagmus, Negative dizziness    Treatment Performed: n/a    Head Thrust: Negative  DVA: 6 line loss  DHI: 22 - Mild    Motion Sensitivity Testing     Movement Intensity (0-5) Duration  5 -10s = 1  11 - 30s = 2  >30s = 3 Score  Intensity + duration score   1. Siting to Supine 0/5 0 0   2. Supine to left side 1/5 3 4   3. " Supine to right side 2/5 3 5   4. Supine to sitting 0/5 0 0   5. L [place] 0/5 0 0   6. Up from left 3/5 1 4   7. R [place] 2/5 1 3   8. Up from right 2/5 2 4   9. Sitting, head tipped to L knee 0/5 0 0   10. Head up from left knee 0/5 0 0   11. Sitting, head tipped to R knee 0/5 0 0   12. Head up from right knee 1/5 1 2   13. Sitting head turns (5) 0/5 0 0   14. Sitting head pitches (5) 0/5 0 0   15. In stance, 180 degree turn to L 0/5 0 0   16. In stance, 180 degree turn to R 0/5 0 0     MSQ = Total score x (# of positions with symptoms)/20.48    MSQ = 22 x 6/20.48 = 6.44    Interpretation:   Mild: 0 - 10  Moderate: 11 - 30  Severe: 31 - 100    3 x 30s, VORx1 horizontal, self-selected speed, no retinal slip  3 x 30s, VORx1 vertical, self-selected speed, no retinal slip    2 x 100 feet, Ambulation in hallway, R<>L head turns  2 x 100 feet, Ambulation in hallway, up<>down head turns  2 x 100 feet, Ambulation in hallway, eyes closed, occasional cueing for path maintenance    Home Exercises Provided and Patient Education Provided     Education provided:   - HEP, POC, Proper technique with therapeutic interventions, Benefits/purpose of today's therapeutic interventions    Written Home Exercises Provided: yes.  Exercises were reviewed and Travon was able to demonstrate them prior to the end of the session.  Travon demonstrated good  understanding of the education provided.     See EMR under Patient Instructions for exercises provided 12/21/2020.    Assessment   Travon tolerated today's session well. His symptoms were most provoked with dynamic tasks, though symptoms did occur with VOR training as well. His DHI score of 22 places his in the Mild category for impact dizziness is having on his life. His MSQ score places him in the mild category for motion sensitivity. Six line loss with DVA testing indicates impaired gaze stabilization. Patient's presentation consistent with central dizziness. PT to continue per  POC, including gaze stabilization training, habituation training, and balance training.     Travon is progressing well towards his goals.   Pt prognosis is Good.     Pt will continue to benefit from skilled outpatient physical therapy to address the deficits listed in the problem list box on initial evaluation, provide pt/family education and to maximize pt's level of independence in the home and community environment.     Pt's spiritual, cultural and educational needs considered and pt agreeable to plan of care and goals.     Anticipated barriers to physical therapy: None foreseen     Goals:         Short Term Goal = 4 weeks Initial - 12/16/2020 Progress Status   Patient will be independent with HEP emphasizing gaze stabilization and habituation  Education required Ongoing   Patient will undergo DVA testing with appropriate goal setting to follow To be tested  Ongoing   Patient will maintain balance in GST condition # 4 for a duration of 10 seconds indicating improved vestibular input/integration for postural control.  4 seconds Ongoing   Patient will maintain balance in GST condition # 6 for a duration of 15 seconds indicating improved vestibular input/integration for postural control.    4 seconds Ongoing      Long Term Goal = 8 weeks Initial - 12/16/2020 Progress Status   Patient will exhibit improved Dizziness Handicap Inventory score by 17 points, indicating decreased impact of dizziness on daily functioning To be given at 1st follow up Ongoing   Patient will maintain balance in GST condition # 6 for a duration of 30 seconds indicating improved vestibular input/integration for postural control.    4 seconds Ongoing   Patient will exhibit improved Functional Gait Assessment score to >/= 28 /30 indicating improved dynamic balance for increased safety ambulatory tasks    25/30 Ongoing             Plan     Progress VOR, habituation, and vision-eliminated tasks.     Mery Lo, PT

## 2020-12-21 ENCOUNTER — CLINICAL SUPPORT (OUTPATIENT)
Dept: REHABILITATION | Facility: HOSPITAL | Age: 75
End: 2020-12-21
Payer: MEDICARE

## 2020-12-21 DIAGNOSIS — H81.91 VESTIBULOPATHY OF RIGHT EAR: ICD-10-CM

## 2020-12-21 DIAGNOSIS — R26.89 IMPAIRMENT OF BALANCE: ICD-10-CM

## 2020-12-21 PROCEDURE — 97112 NEUROMUSCULAR REEDUCATION: CPT | Mod: PO

## 2020-12-23 NOTE — PROGRESS NOTES
"  Physical Therapy Daily Treatment Note     Name: Brooks Joya  Clinic Number: [id number]    Therapy Diagnosis:   Encounter Diagnoses   Name Primary?    Vestibulopathy of right ear     Impairment of balance      Physician: Hung Puentes MD    Visit Date: 12/24/2020    Physician Orders: PT Eval and Treat "Vestibular Therapy "  Medical Diagnosis from Referral: R42 (ICD-10-CM) - Dizziness and giddiness H81.4 (ICD-10-CM) - Vertigo of central origin   Evaluation Date: 12/16/2020  Insurance Authorization Period: 12/16/2020 - 12/30/2020    Plan of Care Expiration: 02/15/2021   Visit # / Visits authorized: 03/06     Time In: 1130  Time Out: 1215  Total Billable Time: 45 minutes     Precautions: Standard, Bilateral Hearing loss (greater on R side)    Missed visits: 0  Cancelled visits: 0  Subjective     Pt reports: with questions to clarify HEP. PT spent ~10 minutes of session clarifying home exercises. Otherwise, he reports that he has been feeling a little better, and continues to require less medication (Diazepam) to manage his symptoms.  He was compliant with home exercise program.  Response to previous treatment: No adverse effects reported   Functional change: Ongoing     Pain: 0/10  Location:  N/a    Objective     Travon participated in neuromuscular re-education activities to improve: Balance and Vestibular Function for 45 minutes. The following activities were included:    HEP Review x 10 minutes:  -Clarifed duration of VORx1 to perform at least 12 minutes overall daily. Can be broken up however he can tolerate.   -Clarified to perform alternating head turns while ambulating a straight path.  -Clarified performing 1 set of ea condition with static balance 3x a day    X on White Background used:  3 x 30s, VORx1 horizontal, 100 bpm, mild dizziness, no retinal slip  3 x 30s, VORx1 vertical, 100 bpm, no dizziness, no retinal slip  2 x 30s, VORx1 with Diagonal head turns, increased "wooziness", no retinal " slip    2 x 100 feet, Ambulation in hallway, R<>L head turns  2 x 100 feet, Ambulation in hallway, up<>down head turns  2 x 100 feet, Ambulation in hallway, ea diagonal head turns, VCs to increase magnitude of head movements  2 x 100 feet, Ambulation in hallway, eyes closed, occasional cueing for path maintenance    Airex, feet together, eyes closed 2 x 30s, SBA  Airex, feet together, eyes closed, R<>L head turns 2 x 30s, Occasional CGA  Airex, feet together, eyes closed, up<>down head turns 2 x 30s, Occasional CGA  Airex, feet apart, Standing Head/Trunk rotation L<>R retrieving a green med ball from therapist standing behind patient  x 60s,        Home Exercises Provided and Patient Education Provided     Education provided:   - HEP, POC, Proper technique with therapeutic interventions, Benefits/purpose of today's therapeutic interventions    Written Home Exercises Provided: yes.  Exercises were reviewed and Travon was able to demonstrate them prior to the end of the session.  Travon demonstrated good  understanding of the education provided.     See EMR under Patient Instructions for exercises provided 12/21/2020.    Assessment   Travon tolerated today's session well. Symptoms were most provoked with VOR horizontal and diagonal today. Symptoms were also provoked with diagonal head turns (when cued to increase range of head turns) as well as turning 180 to begin a new lap. He exhibited appropriate challenge with today's static balance interventions. PT to continue per established POC, progressing as tolerated.     Travon is progressing well towards his goals.   Pt prognosis is Good.     Pt will continue to benefit from skilled outpatient physical therapy to address the deficits listed in the problem list box on initial evaluation, provide pt/family education and to maximize pt's level of independence in the home and community environment.     Pt's spiritual, cultural and educational needs considered and pt  agreeable to plan of care and goals.     Anticipated barriers to physical therapy: None foreseen     Goals:         Short Term Goal = 4 weeks Initial - 12/16/2020 Progress Status   Patient will be independent with HEP emphasizing gaze stabilization and habituation  Education required Ongoing   Patient will undergo DVA testing with appropriate goal setting to follow  Updated: Patient will exhibit </= 4 line loss with Dynamic Visual Acuity testing, indicating improved gaze stabilization   6 line loss - 12/21/2020 Ongoing   Patient will maintain balance in GST condition # 4 for a duration of 10 seconds indicating improved vestibular input/integration for postural control.  4 seconds Ongoing   Patient will maintain balance in GST condition # 6 for a duration of 15 seconds indicating improved vestibular input/integration for postural control.    4 seconds Ongoing      Long Term Goal = 8 weeks Initial - 12/16/2020 Progress Status   Patient will exhibit improved Dizziness Handicap Inventory score by 17 points, indicating decreased impact of dizziness on daily functioning 22 - Mild Ongoing   Patient will maintain balance in GST condition # 6 for a duration of 30 seconds indicating improved vestibular input/integration for postural control.    4 seconds Ongoing   Patient will exhibit improved Functional Gait Assessment score to >/= 28 /30 indicating improved dynamic balance for increased safety ambulatory tasks    25/30 Ongoing             Plan     Progress VOR, habituation, and vision-eliminated tasks.     Mery Lo, PT

## 2020-12-24 ENCOUNTER — CLINICAL SUPPORT (OUTPATIENT)
Dept: REHABILITATION | Facility: HOSPITAL | Age: 75
End: 2020-12-24
Payer: MEDICARE

## 2020-12-24 DIAGNOSIS — H81.91 VESTIBULOPATHY OF RIGHT EAR: ICD-10-CM

## 2020-12-24 DIAGNOSIS — R26.89 IMPAIRMENT OF BALANCE: ICD-10-CM

## 2020-12-24 PROCEDURE — 97112 NEUROMUSCULAR REEDUCATION: CPT | Mod: PO

## 2020-12-29 ENCOUNTER — CLINICAL SUPPORT (OUTPATIENT)
Dept: REHABILITATION | Facility: HOSPITAL | Age: 75
End: 2020-12-29
Payer: MEDICARE

## 2020-12-29 DIAGNOSIS — R26.89 IMPAIRMENT OF BALANCE: ICD-10-CM

## 2020-12-29 DIAGNOSIS — H81.91 VESTIBULOPATHY OF RIGHT EAR: ICD-10-CM

## 2020-12-29 PROCEDURE — 97112 NEUROMUSCULAR REEDUCATION: CPT | Mod: PO

## 2020-12-29 NOTE — PROGRESS NOTES
"  Physical Therapy Daily Treatment Note     Name: Brooks Joya  Clinic Number: [id number]    Therapy Diagnosis:   Encounter Diagnoses   Name Primary?    Vestibulopathy of right ear     Impairment of balance      Physician: Hung Puentes MD    Visit Date: 12/29/2020    Physician Orders: PT Eval and Treat "Vestibular Therapy "  Medical Diagnosis from Referral: R42 (ICD-10-CM) - Dizziness and giddiness H81.4 (ICD-10-CM) - Vertigo of central origin   Evaluation Date: 12/16/2020  Insurance Authorization Period: 12/16/2020 - 12/30/2020    Plan of Care Expiration: 02/15/2021   Visit # / Visits authorized: 04/06     Time In: 13:25  Time Out: 14:15  Total Billable Time: 50 minutes     Precautions: Standard, Bilateral Hearing loss (greater on R side)    Missed visits: 0  Cancelled visits: 0  Subjective     Pt reports: that he is doing horrible today. He is still needing to take Valium PRN to help manage his symptoms of wooziness. His symptoms were high yesterday which is the worst they have been in a while. He endorses having an active day but not a stressful day.     He was compliant with home exercise program.  Response to previous treatment: No adverse effects reported   Functional change: Ongoing     Pain: 0/10  Location:  N/a    Objective     Travon participated in neuromuscular re-education activities to improve: Balance and Vestibular Function for 50 minutes. The following activities were included:    Initial 30 min of therapy session spent discussing patient's current symptoms, purpose of vestibular rehab, and typical progression with vestibular rehab. Patient endorses having a terrible day yesterday in which symptoms of wooziness were high and would not relieve. Typically, he can get his symptoms to relieve temporarily with vestibular exercises, but this did not work yesterday. He remains compliant with vestibular exercises and even performs them more than recommended frequency. He endorses feeling " "frustrated that he is working to improve his symptoms but that they have not seemed to change. PT educated patient that vestibular rehab is a slow recovery process and that patient is still in the beginning phase of his current vestibular rehab. He has only completed 4 total PT sessions (inlcuding today's session) in which 1 was his evaluation. Patient also questioned whether he should transfer care to the PT/OT who works with his current ENT (external to [facility]). PT instructed patient that PT could not speak to how other external vestibular programs operate but that it is his decision as to where he would like to seek therapy care. PT reassured patient that the vestibular rehab team at the [facility] location work as a team and perform vestibular rehab with the same treatment approach. PT instructed patient to give vestibular rehab at least 5-6 more sessions to see if noticeable change occurs. If not, then at that point he will need to discuss other treatment options with his MD. In regards to medication management, PT instructed patient to take medication as prescribed by his MD since stopping certain medications abruptly can have adverse effects. If patient feels that medication needs to be adjusted, then he needs to discuss this with his MD.     2 x 100 feet, Ambulation in hallway, R<>L head turns  2 x 100 feet, Ambulation in hallway, up<>down head turns  2 x 100 feet, Ambulation in hallway, ea diagonal head turns, VCs to increase magnitude of head movements  2 x 100 feet, Ambulation in hallway, eyes closed, occasional cueing for path maintenance    X 3 laps, forward <> backward tandem ambulation in // bars with eyes closed, CGA, occ touchdown support  2 x 30" each LE in front, tandem stance, eyes closed, CGA to occ Min A, occ touchdown support    2 point rocker board:  2 x 60" R<>L weight shifting, vision eliminated, Min A, touchdown support   2 x 60" A<>P weight shifting, vision eliminated, Min A, touchdown " "support    X 5 laps figure 8 ambulation around 2 cones spaced ~6 feet apart, SBA      Home Exercises Provided and Patient Education Provided     Education provided:   - HEP, POC, Proper technique with therapeutic interventions, Benefits/purpose of today's therapeutic interventions    Written Home Exercises Provided: yes.  Exercises were reviewed and Travon was able to demonstrate them prior to the end of the session.  Travon demonstrated good  understanding of the education provided.     See EMR under Patient Instructions for exercises provided 12/21/2020.    Assessment   Travon tolerated today's session fair. Majority of session spent discussing current symptoms, patient's frustrations regarding current symptoms, purpose of vestibular rehab, expected progression with vestibular rehab, and current therapy POC. Symptoms of "wooziness" appear to be exacerbated by patient's emotional response to persistence of current symptoms.  Remainder of session focused on motion tolerance and vision eliminated exercises for improved sense with motion.  PT to continue per established POC, progressing as tolerated.     Travon is progressing well towards his goals.   Pt prognosis is Guarded.     Pt will continue to benefit from skilled outpatient physical therapy to address the deficits listed in the problem list box on initial evaluation, provide pt/family education and to maximize pt's level of independence in the home and community environment.     Pt's spiritual, cultural and educational needs considered and pt agreeable to plan of care and goals.     Anticipated barriers to physical therapy: emotional deficits     Goals:         Short Term Goal = 4 weeks Initial - 12/16/2020 Progress Status   Patient will be independent with HEP emphasizing gaze stabilization and habituation  Education required Ongoing   Patient will undergo DVA testing with appropriate goal setting to follow  Updated: Patient will exhibit </= 4 line loss with " Dynamic Visual Acuity testing, indicating improved gaze stabilization   6 line loss - 12/21/2020 Ongoing   Patient will maintain balance in GST condition # 4 for a duration of 10 seconds indicating improved vestibular input/integration for postural control.  4 seconds Ongoing   Patient will maintain balance in GST condition # 6 for a duration of 15 seconds indicating improved vestibular input/integration for postural control.    4 seconds Ongoing      Long Term Goal = 8 weeks Initial - 12/16/2020 Progress Status   Patient will exhibit improved Dizziness Handicap Inventory score by 17 points, indicating decreased impact of dizziness on daily functioning 22 - Mild Ongoing   Patient will maintain balance in GST condition # 6 for a duration of 30 seconds indicating improved vestibular input/integration for postural control.    4 seconds Ongoing   Patient will exhibit improved Functional Gait Assessment score to >/= 28 /30 indicating improved dynamic balance for increased safety ambulatory tasks    25/30 Ongoing             Plan     Progress VOR, habituation, and vision-eliminated tasks.     Briana Erickson, PT

## 2020-12-31 ENCOUNTER — CLINICAL SUPPORT (OUTPATIENT)
Dept: REHABILITATION | Facility: HOSPITAL | Age: 75
End: 2020-12-31
Payer: MEDICARE

## 2020-12-31 DIAGNOSIS — R26.89 IMPAIRMENT OF BALANCE: ICD-10-CM

## 2020-12-31 DIAGNOSIS — H81.91 VESTIBULOPATHY OF RIGHT EAR: ICD-10-CM

## 2020-12-31 PROCEDURE — 97112 NEUROMUSCULAR REEDUCATION: CPT | Mod: PO

## 2020-12-31 NOTE — PROGRESS NOTES
"  Physical Therapy Daily Treatment Note     Name: Brooks Joya  Clinic Number: [id number]    Therapy Diagnosis:   Encounter Diagnoses   Name Primary?    Vestibulopathy of right ear     Impairment of balance      Physician: Hung Puentes MD    Visit Date: 12/31/2020    Physician Orders: PT Eval and Treat "Vestibular Therapy "  Medical Diagnosis from Referral: R42 (ICD-10-CM) - Dizziness and giddiness H81.4 (ICD-10-CM) - Vertigo of central origin   Evaluation Date: 12/16/2020  Insurance Authorization Period: 12/16/2020 - 12/30/2020    Plan of Care Expiration: 02/15/2021   Visit # / Visits authorized: 05/06     Time In: 1130  Time Out: 1215  Total Billable Time: 45 minutes     Precautions: Standard, Bilateral Hearing loss (greater on R side)    Missed visits: 0  Cancelled visits: 0  Subjective     Pt reports: that he has been having some "awful" days. He reports that he feels fine when playing golf and sitting completely still, but increased symptoms with walking and reading the newspaper.    He was compliant with home exercise program.  Response to previous treatment: No adverse effects reported   Functional change: Ongoing     Pain: 0/10  Location:  N/a    Dizziness:   Pre-session 5/10 "Wooziness"   Post-session: 8/10 (patient rest in clinic until symptoms decreased)  Objective     Travon participated in neuromuscular re-education activities to improve: Balance and Vestibular Function for 50 minutes. The following activities were included:    X on White Background used:  3 x 30s, VORx1 horizontal, 110 bpm, mild dizziness, no retinal slip  3 x 30s, VORx1 vertical, 110 bpm, no dizziness, no retinal slip  2 x 30s, VORx1 with Diagonal head turns, increased "wooziness", no retinal slip  2 x 30s, VORx2 horizontal, mild dizziness  2 x 30s, VORx2 vertical, mild dizziness  3 x 30s Smooth pursuits, numbers target, horizontal, no dizziness  3 x 30s Smooth pursuits, numbers target, vertical, no dizziness    2 x 100 " feet, Ambulation in hallway, fwd/bwd, R<>L head turns  2 x 100 feet, Ambulation in hallway, fwd/bwd, up<>down head turns  2 x 100 feet, Ambulation in hallway, fwd/bwd, ea diagonal head turns, VCs to increase magnitude of head movements  2 x 100 feet, Ambulation in hallway, fwd only, eyes closed, moderate imbalance, occasional cueing for path maintenance    Home Exercises Provided and Patient Education Provided     Education provided:   - HEP, POC, Proper technique with therapeutic interventions, Benefits/purpose of today's therapeutic interventions    Written Home Exercises Provided: yes.  Exercises were reviewed and Travon was able to demonstrate them prior to the end of the session.  Travon demonstrated good  understanding of the education provided.     See EMR under Patient Instructions for exercises provided 12/21/2020.    Assessment   Travon tolerated today's session fairly well. He reported mild dizziness with oculomotor and gaze stabilization exercises. Up to moderate-severe dizziness was elicited with dynamic interventions performed today, mostly due to the addition of backwards ambulation today. Therapist reinforced with patient to perform HEP as directed on the handout to avoid over-stimulation leading to regression of symptoms. PT to continue per established POC, progressing as tolerated.      Travon is progressing well towards his goals.   Pt prognosis is Guarded.     Pt will continue to benefit from skilled outpatient physical therapy to address the deficits listed in the problem list box on initial evaluation, provide pt/family education and to maximize pt's level of independence in the home and community environment.     Pt's spiritual, cultural and educational needs considered and pt agreeable to plan of care and goals.     Anticipated barriers to physical therapy: emotional deficits     Goals:         Short Term Goal = 4 weeks Initial - 12/16/2020 Progress Status   Patient will be independent with  HEP emphasizing gaze stabilization and habituation  Education required Ongoing   Patient will undergo DVA testing with appropriate goal setting to follow  Updated: Patient will exhibit </= 4 line loss with Dynamic Visual Acuity testing, indicating improved gaze stabilization   6 line loss - 12/21/2020 Ongoing   Patient will maintain balance in GST condition # 4 for a duration of 10 seconds indicating improved vestibular input/integration for postural control.  4 seconds Ongoing   Patient will maintain balance in GST condition # 6 for a duration of 15 seconds indicating improved vestibular input/integration for postural control.    4 seconds Ongoing      Long Term Goal = 8 weeks Initial - 12/16/2020 Progress Status   Patient will exhibit improved Dizziness Handicap Inventory score by 17 points, indicating decreased impact of dizziness on daily functioning 22 - Mild Ongoing   Patient will maintain balance in GST condition # 6 for a duration of 30 seconds indicating improved vestibular input/integration for postural control.    4 seconds Ongoing   Patient will exhibit improved Functional Gait Assessment score to >/= 28 /30 indicating improved dynamic balance for increased safety ambulatory tasks    25/30 Ongoing             Plan     Progress VOR, habituation, and vision-eliminated tasks.     Mery Lo, PT

## 2021-01-04 ENCOUNTER — CLINICAL SUPPORT (OUTPATIENT)
Dept: REHABILITATION | Facility: HOSPITAL | Age: 76
End: 2021-01-04
Payer: MEDICARE

## 2021-01-04 DIAGNOSIS — H81.91 VESTIBULOPATHY OF RIGHT EAR: ICD-10-CM

## 2021-01-04 DIAGNOSIS — R26.89 IMPAIRMENT OF BALANCE: ICD-10-CM

## 2021-01-04 PROCEDURE — 97112 NEUROMUSCULAR REEDUCATION: CPT | Mod: PO

## 2021-01-07 ENCOUNTER — CLINICAL SUPPORT (OUTPATIENT)
Dept: REHABILITATION | Facility: HOSPITAL | Age: 76
End: 2021-01-07
Payer: MEDICARE

## 2021-01-07 DIAGNOSIS — H81.91 VESTIBULOPATHY OF RIGHT EAR: ICD-10-CM

## 2021-01-07 DIAGNOSIS — R26.89 IMPAIRMENT OF BALANCE: ICD-10-CM

## 2021-01-07 PROCEDURE — 97112 NEUROMUSCULAR REEDUCATION: CPT | Mod: PO

## 2021-02-04 ENCOUNTER — IMMUNIZATION (OUTPATIENT)
Dept: PHARMACY | Facility: CLINIC | Age: 76
End: 2021-02-04
Payer: MEDICARE

## 2021-02-04 DIAGNOSIS — Z23 NEED FOR VACCINATION: Primary | ICD-10-CM

## 2021-03-04 ENCOUNTER — IMMUNIZATION (OUTPATIENT)
Dept: PHARMACY | Facility: CLINIC | Age: 76
End: 2021-03-04
Payer: MEDICARE

## 2021-03-04 DIAGNOSIS — Z23 NEED FOR VACCINATION: Primary | ICD-10-CM

## 2021-03-29 ENCOUNTER — PATIENT MESSAGE (OUTPATIENT)
Dept: INFECTIOUS DISEASES | Facility: CLINIC | Age: 76
End: 2021-03-29

## 2021-04-28 ENCOUNTER — OFFICE VISIT (OUTPATIENT)
Dept: INFECTIOUS DISEASES | Facility: CLINIC | Age: 76
End: 2021-04-28
Payer: MEDICARE

## 2021-04-28 ENCOUNTER — LAB VISIT (OUTPATIENT)
Dept: LAB | Facility: HOSPITAL | Age: 76
End: 2021-04-28
Attending: INTERNAL MEDICINE
Payer: MEDICARE

## 2021-04-28 VITALS
WEIGHT: 167.75 LBS | HEART RATE: 74 BPM | DIASTOLIC BLOOD PRESSURE: 79 MMHG | TEMPERATURE: 98 F | BODY MASS INDEX: 26.96 KG/M2 | SYSTOLIC BLOOD PRESSURE: 129 MMHG | HEIGHT: 66 IN

## 2021-04-28 DIAGNOSIS — R41.82 ALTERED MENTAL STATUS, UNSPECIFIED ALTERED MENTAL STATUS TYPE: ICD-10-CM

## 2021-04-28 DIAGNOSIS — R42 DIZZINESS: Primary | ICD-10-CM

## 2021-04-28 DIAGNOSIS — R42 DIZZINESS: ICD-10-CM

## 2021-04-28 PROCEDURE — 99999 PR PBB SHADOW E&M-EST. PATIENT-LVL III: ICD-10-PCS | Mod: PBBFAC,,, | Performed by: INTERNAL MEDICINE

## 2021-04-28 PROCEDURE — 86757 RICKETTSIA ANTIBODY: CPT | Performed by: INTERNAL MEDICINE

## 2021-04-28 PROCEDURE — 99204 OFFICE O/P NEW MOD 45 MIN: CPT | Mod: S$GLB,,, | Performed by: INTERNAL MEDICINE

## 2021-04-28 PROCEDURE — 1159F MED LIST DOCD IN RCRD: CPT | Mod: S$GLB,,, | Performed by: INTERNAL MEDICINE

## 2021-04-28 PROCEDURE — 1126F AMNT PAIN NOTED NONE PRSNT: CPT | Mod: S$GLB,,, | Performed by: INTERNAL MEDICINE

## 2021-04-28 PROCEDURE — 1126F PR PAIN SEVERITY QUANTIFIED, NO PAIN PRESENT: ICD-10-PCS | Mod: S$GLB,,, | Performed by: INTERNAL MEDICINE

## 2021-04-28 PROCEDURE — 1101F PT FALLS ASSESS-DOCD LE1/YR: CPT | Mod: CPTII,S$GLB,, | Performed by: INTERNAL MEDICINE

## 2021-04-28 PROCEDURE — 1159F PR MEDICATION LIST DOCUMENTED IN MEDICAL RECORD: ICD-10-PCS | Mod: S$GLB,,, | Performed by: INTERNAL MEDICINE

## 2021-04-28 PROCEDURE — 36415 COLL VENOUS BLD VENIPUNCTURE: CPT | Performed by: INTERNAL MEDICINE

## 2021-04-28 PROCEDURE — 1101F PR PT FALLS ASSESS DOC 0-1 FALLS W/OUT INJ PAST YR: ICD-10-PCS | Mod: CPTII,S$GLB,, | Performed by: INTERNAL MEDICINE

## 2021-04-28 PROCEDURE — 3288F PR FALLS RISK ASSESSMENT DOCUMENTED: ICD-10-PCS | Mod: CPTII,S$GLB,, | Performed by: INTERNAL MEDICINE

## 2021-04-28 PROCEDURE — 3288F FALL RISK ASSESSMENT DOCD: CPT | Mod: CPTII,S$GLB,, | Performed by: INTERNAL MEDICINE

## 2021-04-28 PROCEDURE — 99204 PR OFFICE/OUTPT VISIT, NEW, LEVL IV, 45-59 MIN: ICD-10-PCS | Mod: S$GLB,,, | Performed by: INTERNAL MEDICINE

## 2021-04-28 PROCEDURE — 99999 PR PBB SHADOW E&M-EST. PATIENT-LVL III: CPT | Mod: PBBFAC,,, | Performed by: INTERNAL MEDICINE

## 2021-04-30 LAB
RICK SF IGG TITR SER IF: NORMAL {TITER}
RICK SF IGM TITR SER IF: NORMAL {TITER}

## 2021-06-07 ENCOUNTER — TELEPHONE (OUTPATIENT)
Dept: CARDIOLOGY | Facility: CLINIC | Age: 76
End: 2021-06-07

## 2021-06-07 ENCOUNTER — PATIENT MESSAGE (OUTPATIENT)
Dept: CARDIOLOGY | Facility: CLINIC | Age: 76
End: 2021-06-07

## 2021-06-07 DIAGNOSIS — I34.1 MITRAL VALVE PROLAPSE: ICD-10-CM

## 2021-06-07 DIAGNOSIS — R55 SYNCOPE AND COLLAPSE: Primary | ICD-10-CM

## 2021-06-07 DIAGNOSIS — R55 PRE-SYNCOPE: ICD-10-CM

## 2021-06-08 ENCOUNTER — HOSPITAL ENCOUNTER (OUTPATIENT)
Dept: CARDIOLOGY | Facility: HOSPITAL | Age: 76
Discharge: HOME | End: 2021-06-08
Attending: INTERNAL MEDICINE
Payer: MEDICARE

## 2021-06-08 VITALS
DIASTOLIC BLOOD PRESSURE: 86 MMHG | BODY MASS INDEX: 26.84 KG/M2 | HEIGHT: 66 IN | WEIGHT: 167 LBS | SYSTOLIC BLOOD PRESSURE: 140 MMHG

## 2021-06-08 DIAGNOSIS — I34.1 MITRAL VALVE PROLAPSE: ICD-10-CM

## 2021-06-08 LAB
ASCENDING AORTA: 3.18 CM
AV INDEX (PROSTH): 0.7
AV MEAN GRADIENT: 2 MMHG
AV PEAK GRADIENT: 4 MMHG
AV VALVE AREA: 2.76 CM2
AV VELOCITY RATIO: 0.77
BSA FOR ECHO PROCEDURE: 1.88 M2
CV ECHO LV RWT: 0.39 CM
DOP CALC AO PEAK VEL: 1.05 M/S
DOP CALC AO VTI: 20.89 CM
DOP CALC LVOT AREA: 3.9 CM2
DOP CALC LVOT DIAMETER: 2.24 CM
DOP CALC LVOT PEAK VEL: 0.81 M/S
DOP CALC LVOT STROKE VOLUME: 57.59 CM3
DOP CALCLVOT PEAK VEL VTI: 14.62 CM
E WAVE DECELERATION TIME: 236.46 MSEC
E/A RATIO: 0.78
E/E' RATIO: 13.2 M/S
ECHO LV POSTERIOR WALL: 0.77 CM (ref 0.6–1.1)
EJECTION FRACTION: 65 %
FRACTIONAL SHORTENING: 35 % (ref 28–44)
INTERVENTRICULAR SEPTUM: 0.76 CM (ref 0.6–1.1)
LA MAJOR: 3.76 CM
LA MINOR: 5.15 CM
LA WIDTH: 4.02 CM
LEFT ATRIUM SIZE: 3.47 CM
LEFT ATRIUM VOLUME INDEX MOD: 17.3 ML/M2
LEFT ATRIUM VOLUME INDEX: 27.9 ML/M2
LEFT ATRIUM VOLUME MOD: 31.93 CM3
LEFT ATRIUM VOLUME: 51.54 CM3
LEFT INTERNAL DIMENSION IN SYSTOLE: 2.54 CM (ref 2.1–4)
LEFT VENTRICLE DIASTOLIC VOLUME INDEX: 35.85 ML/M2
LEFT VENTRICLE DIASTOLIC VOLUME: 66.32 ML
LEFT VENTRICLE MASS INDEX: 46 G/M2
LEFT VENTRICLE SYSTOLIC VOLUME INDEX: 12.6 ML/M2
LEFT VENTRICLE SYSTOLIC VOLUME: 23.22 ML
LEFT VENTRICULAR INTERNAL DIMENSION IN DIASTOLE: 3.91 CM (ref 3.5–6)
LEFT VENTRICULAR MASS: 84.81 G
LV LATERAL E/E' RATIO: 11 M/S
LV SEPTAL E/E' RATIO: 16.5 M/S
MV A" WAVE DURATION": 15.13 MSEC
MV PEAK A VEL: 0.85 M/S
MV PEAK E VEL: 0.66 M/S
MV STENOSIS PRESSURE HALF TIME: 68.57 MS
MV VALVE AREA P 1/2 METHOD: 3.21 CM2
PISA TR MAX VEL: 2.4 M/S
PULM VEIN S/D RATIO: 0.67
PV PEAK D VEL: 0.43 M/S
PV PEAK S VEL: 0.29 M/S
RA MAJOR: 3.55 CM
RA PRESSURE: 3 MMHG
RA WIDTH: 3.8 CM
RIGHT VENTRICULAR END-DIASTOLIC DIMENSION: 4.4 CM
RV TISSUE DOPPLER FREE WALL SYSTOLIC VELOCITY 1 (APICAL 4 CHAMBER VIEW): 12.92 CM/S
SINUS: 3.22 CM
STJ: 2.88 CM
TDI LATERAL: 0.06 M/S
TDI SEPTAL: 0.04 M/S
TDI: 0.05 M/S
TR MAX PG: 23 MMHG
TRICUSPID ANNULAR PLANE SYSTOLIC EXCURSION: 2.22 CM
TV REST PULMONARY ARTERY PRESSURE: 26 MMHG

## 2021-06-08 PROCEDURE — 93306 TTE W/DOPPLER COMPLETE: CPT

## 2021-06-08 PROCEDURE — 93306 ECHO (CUPID ONLY): ICD-10-PCS | Mod: 26,,, | Performed by: INTERNAL MEDICINE

## 2021-06-08 PROCEDURE — 93306 TTE W/DOPPLER COMPLETE: CPT | Mod: 26,,, | Performed by: INTERNAL MEDICINE

## 2021-06-09 ENCOUNTER — PATIENT MESSAGE (OUTPATIENT)
Dept: CARDIOLOGY | Facility: CLINIC | Age: 76
End: 2021-06-09

## 2021-06-11 ENCOUNTER — HOSPITAL ENCOUNTER (OUTPATIENT)
Dept: CARDIOLOGY | Facility: HOSPITAL | Age: 76
Discharge: HOME | End: 2021-06-11
Attending: INTERNAL MEDICINE
Payer: MEDICARE

## 2021-06-11 DIAGNOSIS — R55 PRE-SYNCOPE: ICD-10-CM

## 2021-06-11 PROCEDURE — 93227 HOLTER MONITOR - 48 HOUR (CUPID ONLY): ICD-10-PCS | Mod: ,,, | Performed by: INTERNAL MEDICINE

## 2021-06-11 PROCEDURE — 93227 XTRNL ECG REC<48 HR R&I: CPT | Mod: ,,, | Performed by: INTERNAL MEDICINE

## 2021-06-11 PROCEDURE — 93225 XTRNL ECG REC<48 HRS REC: CPT

## 2021-06-15 LAB
OHS CV EVENT MONITOR DAY: 0
OHS CV HOLTER LENGTH DECIMAL HOURS: 48
OHS CV HOLTER LENGTH HOURS: 48
OHS CV HOLTER LENGTH MINUTES: 0

## 2021-06-16 ENCOUNTER — PATIENT MESSAGE (OUTPATIENT)
Dept: CARDIOLOGY | Facility: CLINIC | Age: 76
End: 2021-06-16

## 2021-09-13 ENCOUNTER — PATIENT MESSAGE (OUTPATIENT)
Dept: CARDIOLOGY | Facility: CLINIC | Age: 76
End: 2021-09-13

## 2021-09-13 DIAGNOSIS — I49.8 OTHER SPECIFIED CARDIAC ARRHYTHMIAS: Primary | ICD-10-CM

## 2021-09-17 ENCOUNTER — PATIENT MESSAGE (OUTPATIENT)
Dept: RESEARCH | Facility: HOSPITAL | Age: 76
End: 2021-09-17

## 2021-09-19 PROBLEM — I47.19 ATRIAL TACHYCARDIA: Status: ACTIVE | Noted: 2021-09-19

## 2021-09-19 PROBLEM — G90.9 AUTONOMIC NEUROPATHY: Status: ACTIVE | Noted: 2021-09-19

## 2021-09-20 ENCOUNTER — OFFICE VISIT (OUTPATIENT)
Dept: CARDIOLOGY | Facility: CLINIC | Age: 76
End: 2021-09-20
Payer: MEDICARE

## 2021-09-20 ENCOUNTER — HOSPITAL ENCOUNTER (OUTPATIENT)
Dept: CARDIOLOGY | Facility: CLINIC | Age: 76
Discharge: HOME | End: 2021-09-20
Payer: MEDICARE

## 2021-09-20 VITALS
SYSTOLIC BLOOD PRESSURE: 159 MMHG | DIASTOLIC BLOOD PRESSURE: 88 MMHG | BODY MASS INDEX: 28.74 KG/M2 | HEART RATE: 67 BPM | HEIGHT: 66 IN | WEIGHT: 178.81 LBS

## 2021-09-20 DIAGNOSIS — I47.19 ATRIAL TACHYCARDIA: ICD-10-CM

## 2021-09-20 DIAGNOSIS — G90.9 AUTONOMIC NEUROPATHY: ICD-10-CM

## 2021-09-20 DIAGNOSIS — I25.84 CORONARY ARTERY DISEASE DUE TO CALCIFIED CORONARY LESION: ICD-10-CM

## 2021-09-20 DIAGNOSIS — I25.10 CORONARY ARTERY DISEASE DUE TO CALCIFIED CORONARY LESION: ICD-10-CM

## 2021-09-20 DIAGNOSIS — Z13.6 ENCOUNTER FOR SCREENING FOR CARDIOVASCULAR DISORDERS: ICD-10-CM

## 2021-09-20 DIAGNOSIS — E78.5 DYSLIPIDEMIA: Primary | ICD-10-CM

## 2021-09-20 DIAGNOSIS — M19.90 ARTHRITIS: ICD-10-CM

## 2021-09-20 DIAGNOSIS — I49.8 OTHER SPECIFIED CARDIAC ARRHYTHMIAS: ICD-10-CM

## 2021-09-20 PROCEDURE — 1100F PTFALLS ASSESS-DOCD GE2>/YR: CPT | Mod: CPTII,S$GLB,, | Performed by: INTERNAL MEDICINE

## 2021-09-20 PROCEDURE — 3077F PR MOST RECENT SYSTOLIC BLOOD PRESSURE >= 140 MM HG: ICD-10-PCS | Mod: CPTII,S$GLB,, | Performed by: INTERNAL MEDICINE

## 2021-09-20 PROCEDURE — 3077F SYST BP >= 140 MM HG: CPT | Mod: CPTII,S$GLB,, | Performed by: INTERNAL MEDICINE

## 2021-09-20 PROCEDURE — 99999 PR PBB SHADOW E&M-EST. PATIENT-LVL IV: ICD-10-PCS | Mod: PBBFAC,,, | Performed by: INTERNAL MEDICINE

## 2021-09-20 PROCEDURE — 1100F PR PT FALLS ASSESS DOC 2+ FALLS/FALL W/INJURY/YR: ICD-10-PCS | Mod: CPTII,S$GLB,, | Performed by: INTERNAL MEDICINE

## 2021-09-20 PROCEDURE — 1159F MED LIST DOCD IN RCRD: CPT | Mod: CPTII,S$GLB,, | Performed by: INTERNAL MEDICINE

## 2021-09-20 PROCEDURE — 1160F PR REVIEW ALL MEDS BY PRESCRIBER/CLIN PHARMACIST DOCUMENTED: ICD-10-PCS | Mod: CPTII,S$GLB,, | Performed by: INTERNAL MEDICINE

## 2021-09-20 PROCEDURE — 99999 PR PBB SHADOW E&M-EST. PATIENT-LVL IV: CPT | Mod: PBBFAC,,, | Performed by: INTERNAL MEDICINE

## 2021-09-20 PROCEDURE — 93000 ELECTROCARDIOGRAM COMPLETE: CPT | Mod: S$GLB,,, | Performed by: INTERNAL MEDICINE

## 2021-09-20 PROCEDURE — 1159F PR MEDICATION LIST DOCUMENTED IN MEDICAL RECORD: ICD-10-PCS | Mod: CPTII,S$GLB,, | Performed by: INTERNAL MEDICINE

## 2021-09-20 PROCEDURE — 1126F AMNT PAIN NOTED NONE PRSNT: CPT | Mod: CPTII,S$GLB,, | Performed by: INTERNAL MEDICINE

## 2021-09-20 PROCEDURE — 3288F FALL RISK ASSESSMENT DOCD: CPT | Mod: CPTII,S$GLB,, | Performed by: INTERNAL MEDICINE

## 2021-09-20 PROCEDURE — 3288F PR FALLS RISK ASSESSMENT DOCUMENTED: ICD-10-PCS | Mod: CPTII,S$GLB,, | Performed by: INTERNAL MEDICINE

## 2021-09-20 PROCEDURE — 99204 OFFICE O/P NEW MOD 45 MIN: CPT | Mod: S$GLB,,, | Performed by: INTERNAL MEDICINE

## 2021-09-20 PROCEDURE — 3079F PR MOST RECENT DIASTOLIC BLOOD PRESSURE 80-89 MM HG: ICD-10-PCS | Mod: CPTII,S$GLB,, | Performed by: INTERNAL MEDICINE

## 2021-09-20 PROCEDURE — 3079F DIAST BP 80-89 MM HG: CPT | Mod: CPTII,S$GLB,, | Performed by: INTERNAL MEDICINE

## 2021-09-20 PROCEDURE — 99204 PR OFFICE/OUTPT VISIT, NEW, LEVL IV, 45-59 MIN: ICD-10-PCS | Mod: S$GLB,,, | Performed by: INTERNAL MEDICINE

## 2021-09-20 PROCEDURE — 1126F PR PAIN SEVERITY QUANTIFIED, NO PAIN PRESENT: ICD-10-PCS | Mod: CPTII,S$GLB,, | Performed by: INTERNAL MEDICINE

## 2021-09-20 PROCEDURE — 93000 EKG 12-LEAD: ICD-10-PCS | Mod: S$GLB,,, | Performed by: INTERNAL MEDICINE

## 2021-09-20 PROCEDURE — 1160F RVW MEDS BY RX/DR IN RCRD: CPT | Mod: CPTII,S$GLB,, | Performed by: INTERNAL MEDICINE

## 2021-09-20 RX ORDER — MELOXICAM 15 MG/1
15 TABLET ORAL
COMMUNITY
Start: 2021-06-01 | End: 2021-09-20 | Stop reason: ALTCHOICE

## 2021-09-20 RX ORDER — DESVENLAFAXINE 100 MG/1
100 TABLET, EXTENDED RELEASE ORAL
COMMUNITY
Start: 2021-03-05

## 2021-09-20 RX ORDER — CELECOXIB 200 MG/1
200 CAPSULE ORAL 2 TIMES DAILY
Qty: 60 CAPSULE | Refills: 12 | Status: SHIPPED | OUTPATIENT
Start: 2021-09-20 | End: 2021-10-04 | Stop reason: SDUPTHER

## 2021-09-20 RX ORDER — LEVOTHYROXINE SODIUM 150 UG/1
150 TABLET ORAL EVERY MORNING
COMMUNITY
Start: 2021-08-17

## 2021-09-20 RX ORDER — CLONAZEPAM 0.5 MG/1
1 TABLET ORAL DAILY
COMMUNITY
Start: 2021-01-07

## 2021-09-20 RX ORDER — MELATONIN 3 MG
CAPSULE ORAL
COMMUNITY

## 2021-09-20 RX ORDER — ASPIRIN 81 MG/1
81 TABLET ORAL DAILY
Qty: 30 TABLET | Refills: 0 | Status: SHIPPED | OUTPATIENT
Start: 2021-09-20 | End: 2021-09-24

## 2021-09-22 ENCOUNTER — HOSPITAL ENCOUNTER (OUTPATIENT)
Dept: RADIOLOGY | Facility: HOSPITAL | Age: 76
Discharge: HOME | End: 2021-09-22
Attending: INTERNAL MEDICINE
Payer: MEDICARE

## 2021-09-22 DIAGNOSIS — E78.5 DYSLIPIDEMIA: ICD-10-CM

## 2021-09-22 DIAGNOSIS — Z13.6 ENCOUNTER FOR SCREENING FOR CARDIOVASCULAR DISORDERS: ICD-10-CM

## 2021-09-22 DIAGNOSIS — I25.84 CORONARY ARTERY DISEASE DUE TO CALCIFIED CORONARY LESION: ICD-10-CM

## 2021-09-22 DIAGNOSIS — I25.10 CORONARY ARTERY DISEASE DUE TO CALCIFIED CORONARY LESION: ICD-10-CM

## 2021-09-22 PROCEDURE — 75571 CT HRT W/O DYE W/CA TEST: CPT | Mod: 26,,, | Performed by: INTERNAL MEDICINE

## 2021-09-22 PROCEDURE — 75571 CT CALCIUM SCORING CARDIAC: ICD-10-PCS | Mod: 26,,, | Performed by: INTERNAL MEDICINE

## 2021-09-22 PROCEDURE — 75571 CT HRT W/O DYE W/CA TEST: CPT | Mod: TC

## 2021-09-23 PROBLEM — R91.8 PULMONARY NODULES/LESIONS, MULTIPLE: Status: ACTIVE | Noted: 2021-09-23

## 2021-09-23 PROBLEM — R93.1 AGATSTON CAC SCORE, >400: Status: ACTIVE | Noted: 2021-09-23

## 2021-09-24 ENCOUNTER — PATIENT MESSAGE (OUTPATIENT)
Dept: CARDIOLOGY | Facility: CLINIC | Age: 76
End: 2021-09-24

## 2021-09-28 ENCOUNTER — PATIENT MESSAGE (OUTPATIENT)
Dept: CARDIOLOGY | Facility: CLINIC | Age: 76
End: 2021-09-28

## 2021-09-28 ENCOUNTER — TELEPHONE (OUTPATIENT)
Dept: CARDIOLOGY | Facility: CLINIC | Age: 76
End: 2021-09-28

## 2021-09-29 ENCOUNTER — TELEPHONE (OUTPATIENT)
Dept: CARDIOLOGY | Facility: CLINIC | Age: 76
End: 2021-09-29

## 2021-09-29 DIAGNOSIS — R93.1 ELEVATED CORONARY ARTERY CALCIUM SCORE: ICD-10-CM

## 2021-09-29 DIAGNOSIS — I10 HYPERTENSION, UNSPECIFIED TYPE: Primary | ICD-10-CM

## 2021-09-30 ENCOUNTER — PATIENT MESSAGE (OUTPATIENT)
Dept: CARDIOLOGY | Facility: CLINIC | Age: 76
End: 2021-09-30

## 2021-10-01 RX ORDER — ACETAMINOPHEN 160 MG/5ML
200 SUSPENSION, ORAL (FINAL DOSE FORM) ORAL DAILY
COMMUNITY

## 2021-10-01 RX ORDER — RAMIPRIL 5 MG/1
5 CAPSULE ORAL 2 TIMES DAILY
COMMUNITY
End: 2021-12-06 | Stop reason: SDUPTHER

## 2021-10-01 RX ORDER — ACETAMINOPHEN 500 MG
1 TABLET ORAL DAILY
COMMUNITY

## 2021-10-02 ENCOUNTER — PATIENT MESSAGE (OUTPATIENT)
Dept: CARDIOLOGY | Facility: CLINIC | Age: 76
End: 2021-10-02

## 2021-10-04 DIAGNOSIS — M19.90 ARTHRITIS: ICD-10-CM

## 2021-10-04 RX ORDER — CELECOXIB 200 MG/1
200 CAPSULE ORAL 2 TIMES DAILY
Qty: 180 CAPSULE | Refills: 3 | Status: SHIPPED | OUTPATIENT
Start: 2021-10-04

## 2021-10-04 RX ORDER — CELECOXIB 200 MG/1
200 CAPSULE ORAL 2 TIMES DAILY
Qty: 60 CAPSULE | Refills: 12 | Status: CANCELLED | OUTPATIENT
Start: 2021-10-04

## 2021-10-14 ENCOUNTER — HOSPITAL ENCOUNTER (OUTPATIENT)
Dept: CARDIOLOGY | Facility: HOSPITAL | Age: 76
Discharge: HOME | End: 2021-10-14
Attending: INTERNAL MEDICINE
Payer: MEDICARE

## 2021-10-14 VITALS — WEIGHT: 175 LBS | HEIGHT: 66 IN | BODY MASS INDEX: 28.12 KG/M2

## 2021-10-14 DIAGNOSIS — I10 HYPERTENSION, UNSPECIFIED TYPE: ICD-10-CM

## 2021-10-14 DIAGNOSIS — R93.1 ELEVATED CORONARY ARTERY CALCIUM SCORE: ICD-10-CM

## 2021-10-14 LAB
ASCENDING AORTA: 3.02 CM
BSA FOR ECHO PROCEDURE: 1.92 M2
CV ECHO LV RWT: 0.45 CM
CV STRESS BASE HR: 62 BPM
DIASTOLIC BLOOD PRESSURE: 75 MMHG
DOP CALC LVOT AREA: 3.3 CM2
DOP CALC LVOT DIAMETER: 2.06 CM
DOP CALC LVOT PEAK VEL: 1.15 M/S
DOP CALC LVOT STROKE VOLUME: 78.78 CM3
DOP CALCLVOT PEAK VEL VTI: 23.65 CM
E WAVE DECELERATION TIME: 308.53 MSEC
E/A RATIO: 0.6
E/E' RATIO: 8 M/S
ECHO LV POSTERIOR WALL: 0.87 CM (ref 0.6–1.1)
EJECTION FRACTION: 65 %
FRACTIONAL SHORTENING: 31 % (ref 28–44)
INTERVENTRICULAR SEPTUM: 0.82 CM (ref 0.6–1.1)
LA MAJOR: 4.13 CM
LA MINOR: 4.45 CM
LA WIDTH: 3.98 CM
LEFT ATRIUM SIZE: 3.63 CM
LEFT ATRIUM VOLUME INDEX MOD: 13.9 ML/M2
LEFT ATRIUM VOLUME INDEX: 27.8 ML/M2
LEFT ATRIUM VOLUME MOD: 26.23 CM3
LEFT ATRIUM VOLUME: 52.61 CM3
LEFT INTERNAL DIMENSION IN SYSTOLE: 2.68 CM (ref 2.1–4)
LEFT VENTRICLE DIASTOLIC VOLUME INDEX: 34.07 ML/M2
LEFT VENTRICLE DIASTOLIC VOLUME: 64.4 ML
LEFT VENTRICLE MASS INDEX: 50 G/M2
LEFT VENTRICLE SYSTOLIC VOLUME INDEX: 14 ML/M2
LEFT VENTRICLE SYSTOLIC VOLUME: 26.51 ML
LEFT VENTRICULAR INTERNAL DIMENSION IN DIASTOLE: 3.86 CM (ref 3.5–6)
LEFT VENTRICULAR MASS: 94.98 G
LV LATERAL E/E' RATIO: 7 M/S
LV SEPTAL E/E' RATIO: 9.33 M/S
MV A" WAVE DURATION": 10.28 MSEC
MV PEAK A VEL: 0.94 M/S
MV PEAK E VEL: 0.56 M/S
MV STENOSIS PRESSURE HALF TIME: 89.47 MS
MV VALVE AREA P 1/2 METHOD: 2.46 CM2
OHS CV CPX 1 MINUTE RECOVERY HEART RATE: 121 BPM
OHS CV CPX 85 PERCENT MAX PREDICTED HEART RATE MALE: 122
OHS CV CPX ESTIMATED METS: 11
OHS CV CPX MAX PREDICTED HEART RATE: 144
OHS CV CPX PATIENT IS FEMALE: 0
OHS CV CPX PATIENT IS MALE: 1
OHS CV CPX PEAK DIASTOLIC BLOOD PRESSURE: 65 MMHG
OHS CV CPX PEAK HEAR RATE: 139 BPM
OHS CV CPX PEAK RATE PRESSURE PRODUCT: NORMAL
OHS CV CPX PEAK SYSTOLIC BLOOD PRESSURE: 188 MMHG
OHS CV CPX PERCENT MAX PREDICTED HEART RATE ACHIEVED: 97
OHS CV CPX RATE PRESSURE PRODUCT PRESENTING: 8494
PISA TR MAX VEL: 2.2 M/S
PULM VEIN S/D RATIO: 1.58
PV PEAK D VEL: 0.31 M/S
PV PEAK S VEL: 0.49 M/S
RA MAJOR: 4.02 CM
RA PRESSURE: 3 MMHG
RA WIDTH: 2.85 CM
RIGHT VENTRICULAR END-DIASTOLIC DIMENSION: 2.32 CM
RV TISSUE DOPPLER FREE WALL SYSTOLIC VELOCITY 1 (APICAL 4 CHAMBER VIEW): 10.34 CM/S
SINUS: 3.09 CM
STJ: 3.24 CM
STRESS ECHO POST EXERCISE DUR MIN: 6 MINUTES
STRESS ECHO POST EXERCISE DUR SEC: 51 SECONDS
SYSTOLIC BLOOD PRESSURE: 137 MMHG
TDI LATERAL: 0.08 M/S
TDI SEPTAL: 0.06 M/S
TDI: 0.07 M/S
TR MAX PG: 19 MMHG
TRICUSPID ANNULAR PLANE SYSTOLIC EXCURSION: 1.92 CM
TV REST PULMONARY ARTERY PRESSURE: 22 MMHG

## 2021-10-14 PROCEDURE — 93351 STRESS TTE COMPLETE: CPT

## 2021-10-14 PROCEDURE — 93351 STRESS ECHO (CUPID ONLY): ICD-10-PCS | Mod: 26,,, | Performed by: INTERNAL MEDICINE

## 2021-10-14 PROCEDURE — 93351 STRESS TTE COMPLETE: CPT | Mod: 26,,, | Performed by: INTERNAL MEDICINE

## 2021-11-18 ENCOUNTER — PATIENT MESSAGE (OUTPATIENT)
Dept: CARDIOLOGY | Facility: CLINIC | Age: 76
End: 2021-11-18
Payer: MEDICARE

## 2021-11-26 ENCOUNTER — PATIENT MESSAGE (OUTPATIENT)
Dept: CARDIOLOGY | Facility: CLINIC | Age: 76
End: 2021-11-26
Payer: MEDICARE

## 2021-11-30 ENCOUNTER — PATIENT MESSAGE (OUTPATIENT)
Dept: CARDIOLOGY | Facility: CLINIC | Age: 76
End: 2021-11-30
Payer: MEDICARE

## 2021-12-01 RX ORDER — AMLODIPINE BESYLATE 5 MG/1
5 TABLET ORAL NIGHTLY
Qty: 30 TABLET | Refills: 11 | Status: SHIPPED | OUTPATIENT
Start: 2021-12-01 | End: 2021-12-20 | Stop reason: SDUPTHER

## 2021-12-01 RX ORDER — AMLODIPINE BESYLATE 5 MG/1
5 TABLET ORAL NIGHTLY
COMMUNITY
End: 2021-12-01 | Stop reason: SDUPTHER

## 2021-12-05 ENCOUNTER — PATIENT MESSAGE (OUTPATIENT)
Dept: CARDIOLOGY | Facility: CLINIC | Age: 76
End: 2021-12-05
Payer: MEDICARE

## 2021-12-06 RX ORDER — RAMIPRIL 5 MG/1
5 CAPSULE ORAL 2 TIMES DAILY
Qty: 180 CAPSULE | Refills: 3 | Status: SHIPPED | OUTPATIENT
Start: 2021-12-06 | End: 2021-12-20 | Stop reason: SDUPTHER

## 2021-12-10 ENCOUNTER — OFFICE VISIT (OUTPATIENT)
Dept: CARDIOLOGY | Facility: CLINIC | Age: 76
End: 2021-12-10
Payer: MEDICARE

## 2021-12-10 VITALS
OXYGEN SATURATION: 97 % | WEIGHT: 181 LBS | HEART RATE: 82 BPM | BODY MASS INDEX: 29.21 KG/M2 | DIASTOLIC BLOOD PRESSURE: 70 MMHG | SYSTOLIC BLOOD PRESSURE: 138 MMHG

## 2021-12-10 DIAGNOSIS — E78.5 DYSLIPIDEMIA: ICD-10-CM

## 2021-12-10 DIAGNOSIS — M47.812 ARTHROPATHY OF CERVICAL SPINE: ICD-10-CM

## 2021-12-10 DIAGNOSIS — I25.10 CORONARY ARTERY DISEASE DUE TO CALCIFIED CORONARY LESION: ICD-10-CM

## 2021-12-10 DIAGNOSIS — R93.1 AGATSTON CAC SCORE, >400: ICD-10-CM

## 2021-12-10 DIAGNOSIS — M62.9 DISORDER OF MUSCLE, LIGAMENT, AND FASCIA: ICD-10-CM

## 2021-12-10 DIAGNOSIS — R42 DIZZINESS: Primary | ICD-10-CM

## 2021-12-10 DIAGNOSIS — R91.8 PULMONARY NODULES/LESIONS, MULTIPLE: ICD-10-CM

## 2021-12-10 DIAGNOSIS — I25.84 CORONARY ARTERY DISEASE DUE TO CALCIFIED CORONARY LESION: ICD-10-CM

## 2021-12-10 DIAGNOSIS — I47.19 ATRIAL TACHYCARDIA: ICD-10-CM

## 2021-12-10 DIAGNOSIS — M24.20 DISORDER OF MUSCLE, LIGAMENT, AND FASCIA: ICD-10-CM

## 2021-12-10 PROCEDURE — 99205 PR OFFICE/OUTPT VISIT, NEW, LEVL V, 60-74 MIN: ICD-10-PCS | Mod: S$GLB,,, | Performed by: INTERNAL MEDICINE

## 2021-12-10 PROCEDURE — 99999 PR PBB SHADOW E&M-EST. PATIENT-LVL III: ICD-10-PCS | Mod: PBBFAC,,, | Performed by: INTERNAL MEDICINE

## 2021-12-10 PROCEDURE — 99205 OFFICE O/P NEW HI 60 MIN: CPT | Mod: S$GLB,,, | Performed by: INTERNAL MEDICINE

## 2021-12-10 PROCEDURE — 99999 PR PBB SHADOW E&M-EST. PATIENT-LVL III: CPT | Mod: PBBFAC,,, | Performed by: INTERNAL MEDICINE

## 2021-12-10 RX ORDER — CYCLOBENZAPRINE HCL 10 MG
10 TABLET ORAL 3 TIMES DAILY PRN
Qty: 90 TABLET | Refills: 11 | Status: SHIPPED | OUTPATIENT
Start: 2021-12-10

## 2021-12-11 ENCOUNTER — PATIENT MESSAGE (OUTPATIENT)
Dept: CARDIOLOGY | Facility: CLINIC | Age: 76
End: 2021-12-11
Payer: MEDICARE

## 2021-12-13 ENCOUNTER — PATIENT MESSAGE (OUTPATIENT)
Dept: CARDIOLOGY | Facility: CLINIC | Age: 76
End: 2021-12-13
Payer: MEDICARE

## 2021-12-14 ENCOUNTER — PATIENT MESSAGE (OUTPATIENT)
Dept: CARDIOLOGY | Facility: CLINIC | Age: 76
End: 2021-12-14
Payer: MEDICARE

## 2021-12-20 RX ORDER — RAMIPRIL 5 MG/1
5 CAPSULE ORAL 2 TIMES DAILY
Qty: 180 CAPSULE | Refills: 3 | Status: SHIPPED | OUTPATIENT
Start: 2021-12-20 | End: 2022-01-06 | Stop reason: SDUPTHER

## 2021-12-20 RX ORDER — AMLODIPINE BESYLATE 5 MG/1
5 TABLET ORAL NIGHTLY
Qty: 90 TABLET | Refills: 3 | Status: SHIPPED | OUTPATIENT
Start: 2021-12-20 | End: 2022-01-06 | Stop reason: SDUPTHER

## 2021-12-21 ENCOUNTER — PATIENT MESSAGE (OUTPATIENT)
Dept: CARDIOLOGY | Facility: CLINIC | Age: 76
End: 2021-12-21
Payer: MEDICARE

## 2022-01-04 ENCOUNTER — PATIENT MESSAGE (OUTPATIENT)
Dept: CARDIOLOGY | Facility: CLINIC | Age: 77
End: 2022-01-04
Payer: MEDICARE

## 2022-01-05 ENCOUNTER — PATIENT MESSAGE (OUTPATIENT)
Dept: CARDIOLOGY | Facility: CLINIC | Age: 77
End: 2022-01-05
Payer: MEDICARE

## 2022-01-06 RX ORDER — AMLODIPINE BESYLATE 5 MG/1
5 TABLET ORAL NIGHTLY
Qty: 90 TABLET | Refills: 3 | Status: SHIPPED | OUTPATIENT
Start: 2022-01-06 | End: 2022-03-07 | Stop reason: SDUPTHER

## 2022-01-06 RX ORDER — RAMIPRIL 5 MG/1
5 CAPSULE ORAL 2 TIMES DAILY
Qty: 180 CAPSULE | Refills: 3 | Status: SHIPPED | OUTPATIENT
Start: 2022-01-06 | End: 2022-03-07 | Stop reason: SDUPTHER

## 2022-01-06 NOTE — TELEPHONE ENCOUNTER
Hctz/Triamterene 25.37.5 one daily in am or as directed. Tell him to start with just half in am. BP will also be lower with less pain,CJL

## 2022-01-14 ENCOUNTER — PATIENT MESSAGE (OUTPATIENT)
Dept: CARDIOLOGY | Facility: CLINIC | Age: 77
End: 2022-01-14
Payer: MEDICARE

## 2022-02-12 ENCOUNTER — PATIENT MESSAGE (OUTPATIENT)
Dept: CARDIOLOGY | Facility: CLINIC | Age: 77
End: 2022-02-12
Payer: MEDICARE

## 2022-03-05 ENCOUNTER — PATIENT MESSAGE (OUTPATIENT)
Dept: CARDIOLOGY | Facility: CLINIC | Age: 77
End: 2022-03-05
Payer: MEDICARE

## 2022-03-07 RX ORDER — AMLODIPINE BESYLATE 5 MG/1
5 TABLET ORAL NIGHTLY
Qty: 90 TABLET | Refills: 3 | Status: SHIPPED | OUTPATIENT
Start: 2022-03-07 | End: 2022-03-16

## 2022-03-07 RX ORDER — RAMIPRIL 5 MG/1
5 CAPSULE ORAL 2 TIMES DAILY
Qty: 180 CAPSULE | Refills: 3 | Status: SHIPPED | OUTPATIENT
Start: 2022-03-07 | End: 2022-05-18 | Stop reason: DRUGHIGH

## 2022-03-15 ENCOUNTER — PATIENT MESSAGE (OUTPATIENT)
Dept: CARDIOLOGY | Facility: CLINIC | Age: 77
End: 2022-03-15
Payer: MEDICARE

## 2022-03-16 RX ORDER — AMLODIPINE BESYLATE 10 MG/1
10 TABLET ORAL DAILY
COMMUNITY
End: 2022-03-16

## 2022-03-17 RX ORDER — AMLODIPINE BESYLATE 10 MG/1
10 TABLET ORAL DAILY
Qty: 90 TABLET | Refills: 3 | Status: SHIPPED | OUTPATIENT
Start: 2022-03-17

## 2022-04-01 ENCOUNTER — PATIENT MESSAGE (OUTPATIENT)
Dept: CARDIOLOGY | Facility: CLINIC | Age: 77
End: 2022-04-01
Payer: MEDICARE

## 2022-04-15 ENCOUNTER — PATIENT MESSAGE (OUTPATIENT)
Dept: CARDIOLOGY | Facility: CLINIC | Age: 77
End: 2022-04-15
Payer: MEDICARE

## 2022-05-03 ENCOUNTER — PATIENT MESSAGE (OUTPATIENT)
Dept: CARDIOLOGY | Facility: CLINIC | Age: 77
End: 2022-05-03
Payer: MEDICARE

## 2022-05-18 RX ORDER — RAMIPRIL 10 MG/1
10 CAPSULE ORAL DAILY
Qty: 90 CAPSULE | Refills: 3 | Status: SHIPPED | OUTPATIENT
Start: 2022-05-18 | End: 2023-05-18

## 2022-05-18 NOTE — TELEPHONE ENCOUNTER
From: Ever Pinto M.D. [[email or web address]   Sent: Thursday, May 12, 2022 6:34 PM  To: Travon Joya <[email or web address]>; Tahmina De Oliveira <[email or web address]>; Amanda Garber <[email or web address]>  Subject: Re: Skylar Bosch or Amanda please send in the Ramipril 10 mg pills- it would be best to just take one Celecoxib and only use the second on the bad days! Ever

## 2023-01-05 ENCOUNTER — TELEPHONE (OUTPATIENT)
Dept: OPHTHALMOLOGY | Facility: CLINIC | Age: 78
End: 2023-01-05
Payer: MEDICARE

## 2023-01-05 NOTE — TELEPHONE ENCOUNTER
----- Message from Hallie Delnog sent at 1/5/2023  3:02 PM CST -----  Regarding: Referral Inquiry  Pt called about a referral sent by [facility], Pt Is upset because referral was supposed to have been sent 5 days ago.    Requesting call back: [phone]

## 2023-01-05 NOTE — TELEPHONE ENCOUNTER
Spoke with pt stating I did not received his referral. However, Dr. Null's office called stating they were having trouble with faxing referral with their fax machine. I'd informed pt of this, he will contact office to refax or mail referral.

## 2023-01-09 ENCOUNTER — TELEPHONE (OUTPATIENT)
Dept: OPHTHALMOLOGY | Facility: CLINIC | Age: 78
End: 2023-01-09
Payer: MEDICARE

## 2023-01-09 NOTE — TELEPHONE ENCOUNTER
----- Message from Lottie Abbasi sent at 12/30/2022  4:31 PM CST -----  Stanford Rosas sent to [facility] Staff  Caller: Unspecified (Today,  4:04 PM)  Dr. Gilmore is trying to refer a Pt over but the fax isn't going through multiple times since the 28th. Is there another way they can send it. Please call back to further assist. PH#[phone] Lucy

## 2023-03-29 NOTE — PROGRESS NOTES
"  Date:  3/30/2023    ?  Referring Provider:   Tmomie Gilmore MD    Copies of Letters to the Following:   Tommie Gilmore MD    Chief Complaint:  I saw Brooks Joya at the [facility] for neuro-ophthalmic evaluation.   He is a 78 y.o. male with a history of HTN, HLD, pulmonary nodules, autonomic neuropathy, DAVID on CPAP, who presents for evaluation of subjectively blurred vision following LASIK OS in 11/2022.    History:       79 y/o male present to clinic for Neuro-ophthalmic evaluation for possible   Optic Neuropathy. He was d/x with. Dysautonomia. H/o of bilateral Cataract   Extraction with Lasik, OS- . Pt present to clinic with concerns of visual   disturbance. History of MFL for Cataract surgery. Pt states after    Cataract surgery vision was "fuzzy" that prompt him to proceed with Lasik   with not much improvement.  Occasional floaters. Positive for tinnitus.   He has dry eyes, self treating with Systane. He has dizzy spells from   dysautonomia. No headaches or diplopia reported.     He reports distortion of images like halos/starbursts around lights. No visual static or floaters. Very frustrated that he will need glasses post PCIOL OU and LASIK OS.     Eyemeds  Systane OU BID       12/2022 Deirdre    ?  Current Outpatient Medications   Medication Sig Dispense Refill    allopurinol (ZYLOPRIM) 300 MG tablet Take 300 mg by mouth once daily.      amLODIPine (NORVASC) 10 MG tablet Take 1 tablet (10 mg total) by mouth once daily. 90 tablet 3    aspirin (ECOTRIN) 81 MG EC tablet TAKE 1 TABLET BY MOUTH EVERY DAY 90 tablet 1    atorvastatin (LIPITOR) 40 MG tablet       celecoxib (CELEBREX) 200 MG capsule Take 1 capsule (200 mg total) by mouth 2 (two) times daily. 180 capsule 3    cholecalciferol, vitamin D3, (VITAMIN D3) 50 mcg (2,000 unit) Cap Take 1 capsule by mouth once daily.      clonazePAM (KLONOPIN) 0.5 MG tablet Take 1 tablet by mouth once daily.      coenzyme Q10 200 mg capsule Take " 200 mg by mouth once daily.      cyclobenzaprine (FLEXERIL) 10 MG tablet Take 1 tablet (10 mg total) by mouth 3 (three) times daily as needed for Muscle spasms. 90 tablet 11    desvenlafaxine succinate (PRISTIQ) 100 MG Tb24 Take 100 mg by mouth.      diazepam (VALIUM) 2 MG tablet Take 2 mg by mouth every 6 (six) hours as needed.      Lactobacillus rhamnosus GG (CULTURELLE) 10 billion cell capsule Take 1 capsule by mouth once daily.      levothyroxine (SYNTHROID) 150 MCG tablet Take 150 mcg by mouth every morning.      levothyroxine (SYNTHROID, LEVOTHROID) 175 MCG tablet Take 175 mcg by mouth once daily.      melatonin 3 mg Cap Take by mouth.      omega 3-dha-epa-fish oil 35-25-5-113.5 mg Chew Take by mouth.      potassium citrate (UROCIT-K) 10 mEq TbSR       psyllium husk, with sugar, 3.4 gram/7 gram Powd Take 11.8 g by mouth.      ramipriL (ALTACE) 10 MG capsule Take 1 capsule (10 mg total) by mouth once daily. 90 capsule 3    tamsulosin (FLOMAX) 0.4 mg Cap Take 0.4 mg by mouth once daily.      triamterene-hydrochlorothiazide 37.5-25 mg (DYAZIDE) 37.5-25 mg per capsule TAKE 1 CAPSULE BY MOUTH EVERY DAY OR AS DIRECTED 90 capsule 3    UBIQUINOL, BULK, MISC by Misc.(Non-Drug; Combo Route) route.       No current facility-administered medications for this visit.     Review of patient's allergies indicates:   Allergen Reactions    Sulfa (sulfonamide antibiotics)      History reviewed. No pertinent past medical history.  History reviewed. No pertinent surgical history.  Family History   Problem Relation Age of Onset    Hypertension Mother     Heart attack Father      Social History     Socioeconomic History    Marital status:    Tobacco Use    Smoking status: Never    Smokeless tobacco: Never   Substance and Sexual Activity    Alcohol use: Not Currently       Examination:  He was well-appearing. He was alert and oriented. Attention span and concentration were normal. Speech, language, memory, and general knowledge  were intact.     His distance visual acuity with correction was 20/30  in the right eye and 20/50  in the left eye. His near visual acuity without correction was J4 PH J3 in the right eye and J3 PH J2 in the left eye.     Visual fields were intact to confrontation. He perceived 8/8 OD and 8/8 OS Ishihara color plates correctly. Pupils were brisk to light without an afferent defect. Ocular ductions were full. Orthophoric in primary, right, and left gaze by cross cover. There was no nystagmus. Saccades and pursuits were normal. Lids were symmetric.     Optic discs appeared normal without swelling or pallor. Pupillary dilation was not necessary for visualization of the optic disc today.     On the remainder of the neurologic examination, facial sensation was intact. Face was symmetric. Tongue was midline.     Laboratories Reviewed:     N/a  ?  Neuroimaging Reviewed:     5/2021 MRI brain wo contrast      ?  Ocular Imaging, Photos, Records Reviewed:     OCT RNFL Today 3/30/2023:   Right Eye - Average RNFL 94 all segments normal   Left Eye - Average RNFL 91 all segments normal     Normal macular architecture OU    Visual Field Test 24-2 OU Today 3/30/2023: Right Eye - fixation losses 0/12, false positives 0%, false negatives 25%, MD -3.69dB, Impression OD: rim artifact. Left Eye - fixation losses 10/11, false positives 0%, false negatives 1%, MD -2.89dB, Impression OS: rim artifact some mild inferior points.  ?  Impression:  Brooks Joya has history of HTN, HLD, pulmonary nodules, autonomic neuropathy, PPPD, DAVID on CPAP who presents for evaluation of subjectively blurred vision following LASIK OS in 11/2022. They report fuzzy vision despite bilateral cataract extractions and left eye LASIK. Describes some fuzziness in left eye especially some halos/starbursts around lights as well. Neuro-ophthalmologic examination was notable for slightly reduced visual acuities (refracted to 20/20 OD and 20/30 OS), full color  vision, normal ocular motility and alignment. During examination he reports that the right eye vision is clear and the left is blurred. He notices more issues up close than at a distance. Convergence intact. OCT with normal and symmetric RNFL thicknesses and normal macular architecture OU. Formal visual fields were essentially full OD and OS. No evidence of optic neuropathy or cortical pattern of vision changes. I believe he has not been able to adjust to the multifocal lens OS. He is interested in possible IOL exchange with Dr. Gilmore. The dysautonomia should not cause any fluctuation in his required refractive correction.   ?  Plan:  1. Follow up with Dr. Gilmore as planned  2. Continue aggressive ocular lubrication, can add systane or refresh gel nightly for dry eye    Follow-up:  I will see him in follow-up on an as needed basis  ?  ?  Visit Checklist (as applicable):  1. Status of new and prior symptoms discussed? yes  2. Neuroimaging reviewed/ ordered as appropriate? yes  3. Ocular imaging and photos reviewed/ ordered as appropriate? yes  4. Plan for work-up and treatment discussed with patient? yes  5. Potential medication side-effects and monitoring plan discussed? N/a  6. Review of outside medical records was performed and pertinent details are summarized in the HPI above? yes    Time spent on this encounter: 60 minutes. This includes face to face time and non-face to face time preparing to see the patient (eg, review of tests), obtaining and/or reviewing separately obtained history, documenting clinical information in the electronic or other health record, independently interpreting results and communicating results to the patient/family/caregiver, or care coordinator.      RACHEL Suárez  Neuro-Ophthalmology Consultant

## 2023-03-30 ENCOUNTER — CLINICAL SUPPORT (OUTPATIENT)
Dept: OPHTHALMOLOGY | Facility: CLINIC | Age: 78
End: 2023-03-30
Payer: MEDICARE

## 2023-03-30 ENCOUNTER — OFFICE VISIT (OUTPATIENT)
Dept: OPHTHALMOLOGY | Facility: CLINIC | Age: 78
End: 2023-03-30
Payer: MEDICARE

## 2023-03-30 DIAGNOSIS — H53.8 BLURRED VISION, LEFT EYE: Primary | ICD-10-CM

## 2023-03-30 DIAGNOSIS — H53.15 VISUAL DISTORTIONS OF SHAPE AND SIZE: ICD-10-CM

## 2023-03-30 PROCEDURE — 99999 PR PBB SHADOW E&M-EST. PATIENT-LVL II: ICD-10-PCS | Mod: PBBFAC,,, | Performed by: STUDENT IN AN ORGANIZED HEALTH CARE EDUCATION/TRAINING PROGRAM

## 2023-03-30 PROCEDURE — 1101F PR PT FALLS ASSESS DOC 0-1 FALLS W/OUT INJ PAST YR: ICD-10-PCS | Mod: CPTII,S$GLB,, | Performed by: STUDENT IN AN ORGANIZED HEALTH CARE EDUCATION/TRAINING PROGRAM

## 2023-03-30 PROCEDURE — 99999 PR PBB SHADOW E&M-EST. PATIENT-LVL II: CPT | Mod: PBBFAC,,, | Performed by: STUDENT IN AN ORGANIZED HEALTH CARE EDUCATION/TRAINING PROGRAM

## 2023-03-30 PROCEDURE — 92083 EXTENDED VISUAL FIELD XM: CPT | Mod: S$GLB,,, | Performed by: STUDENT IN AN ORGANIZED HEALTH CARE EDUCATION/TRAINING PROGRAM

## 2023-03-30 PROCEDURE — 3288F PR FALLS RISK ASSESSMENT DOCUMENTED: ICD-10-PCS | Mod: CPTII,S$GLB,, | Performed by: STUDENT IN AN ORGANIZED HEALTH CARE EDUCATION/TRAINING PROGRAM

## 2023-03-30 PROCEDURE — 1160F PR REVIEW ALL MEDS BY PRESCRIBER/CLIN PHARMACIST DOCUMENTED: ICD-10-PCS | Mod: CPTII,S$GLB,, | Performed by: STUDENT IN AN ORGANIZED HEALTH CARE EDUCATION/TRAINING PROGRAM

## 2023-03-30 PROCEDURE — 92133 CPTRZD OPH DX IMG PST SGM ON: CPT | Mod: S$GLB,,, | Performed by: STUDENT IN AN ORGANIZED HEALTH CARE EDUCATION/TRAINING PROGRAM

## 2023-03-30 PROCEDURE — 92133 POSTERIOR SEGMENT OCT OPTIC NERVE(OCULAR COHERENCE TOMOGRAPHY) - OU - BOTH EYES: ICD-10-PCS | Mod: S$GLB,,, | Performed by: STUDENT IN AN ORGANIZED HEALTH CARE EDUCATION/TRAINING PROGRAM

## 2023-03-30 PROCEDURE — 1160F RVW MEDS BY RX/DR IN RCRD: CPT | Mod: CPTII,S$GLB,, | Performed by: STUDENT IN AN ORGANIZED HEALTH CARE EDUCATION/TRAINING PROGRAM

## 2023-03-30 PROCEDURE — 1159F PR MEDICATION LIST DOCUMENTED IN MEDICAL RECORD: ICD-10-PCS | Mod: CPTII,S$GLB,, | Performed by: STUDENT IN AN ORGANIZED HEALTH CARE EDUCATION/TRAINING PROGRAM

## 2023-03-30 PROCEDURE — 3288F FALL RISK ASSESSMENT DOCD: CPT | Mod: CPTII,S$GLB,, | Performed by: STUDENT IN AN ORGANIZED HEALTH CARE EDUCATION/TRAINING PROGRAM

## 2023-03-30 PROCEDURE — 1126F PR PAIN SEVERITY QUANTIFIED, NO PAIN PRESENT: ICD-10-PCS | Mod: CPTII,S$GLB,, | Performed by: STUDENT IN AN ORGANIZED HEALTH CARE EDUCATION/TRAINING PROGRAM

## 2023-03-30 PROCEDURE — 99205 PR OFFICE/OUTPT VISIT, NEW, LEVL V, 60-74 MIN: ICD-10-PCS | Mod: S$GLB,,, | Performed by: STUDENT IN AN ORGANIZED HEALTH CARE EDUCATION/TRAINING PROGRAM

## 2023-03-30 PROCEDURE — 1101F PT FALLS ASSESS-DOCD LE1/YR: CPT | Mod: CPTII,S$GLB,, | Performed by: STUDENT IN AN ORGANIZED HEALTH CARE EDUCATION/TRAINING PROGRAM

## 2023-03-30 PROCEDURE — 99205 OFFICE O/P NEW HI 60 MIN: CPT | Mod: S$GLB,,, | Performed by: STUDENT IN AN ORGANIZED HEALTH CARE EDUCATION/TRAINING PROGRAM

## 2023-03-30 PROCEDURE — 92083 HUMPHREY VISUAL FIELD - OU - BOTH EYES: ICD-10-PCS | Mod: S$GLB,,, | Performed by: STUDENT IN AN ORGANIZED HEALTH CARE EDUCATION/TRAINING PROGRAM

## 2023-03-30 PROCEDURE — 1126F AMNT PAIN NOTED NONE PRSNT: CPT | Mod: CPTII,S$GLB,, | Performed by: STUDENT IN AN ORGANIZED HEALTH CARE EDUCATION/TRAINING PROGRAM

## 2023-03-30 PROCEDURE — 1159F MED LIST DOCD IN RCRD: CPT | Mod: CPTII,S$GLB,, | Performed by: STUDENT IN AN ORGANIZED HEALTH CARE EDUCATION/TRAINING PROGRAM

## 2023-03-30 NOTE — LETTER
[facility] - [address]  [address]  [facility] [address]  Phone: [phone]  Fax: [phone]   March 30, 2023    Tommie Gilmore MD  [address]  [facility]  [place] [address]    Patient: Brooks Joya   MR Number: [id number]   YOB: 1945   Date of Visit: 3/30/2023       Dear Dr. Gilmore :    Thank you for referring Brooks Joya to me for evaluation. Here is my assessment and plan of care:       Impression:  Brooks Joya has history of HTN, HLD, pulmonary nodules, autonomic neuropathy, PPPD, DAVID on CPAP who presents for evaluation of subjectively blurred vision following LASIK OS in 11/2022. They report fuzzy vision despite bilateral cataract extractions and left eye LASIK. Describes some fuzziness in left eye especially some halos/starbursts around lights as well. Neuro-ophthalmologic examination was notable for slightly reduced visual acuities (refracted to 20/20 OD and 20/30 OS), full color vision, normal ocular motility and alignment. During examination he reports that the right eye vision is clear and the left is blurred. He notices more issues up close than at a distance. Convergence intact. OCT with normal and symmetric RNFL thicknesses and normal macular architecture OU. Formal visual fields were essentially full OD and OS. No evidence of optic neuropathy or cortical pattern of vision changes. I believe he has not been able to adjust to the multifocal lens OS. He is interested in possible IOL exchange with Dr. Gilmore. The dysautonomia should not cause any fluctuation in his required refractive correction.      Plan:  1. Follow up with Dr. Gilmore as planned  2. Continue aggressive ocular lubrication, can add systane or refresh gel nightly for dry eye    Follow-up:  I will see him in follow-up on an as needed basis    If you have questions, please do not hesitate to call me. I look forward to following Mr. Brooks Joya along with  you.    Sincerely,        Angelique Erickson MD       CC  No Recipients

## 2023-05-10 ENCOUNTER — TELEPHONE (OUTPATIENT)
Dept: PEDIATRIC CARDIOLOGY | Facility: CLINIC | Age: 78
End: 2023-05-10
Payer: MEDICARE

## 2023-05-10 NOTE — TELEPHONE ENCOUNTER
Spoke to pt regarding referral from Dr. Griggs. Advised referral was not received as of today but RN will call Dr. Griggs's office to clarify referral and dx. Spoke with Dr. Monroe office. Patient dx with an ASD, will have Dr. Solano review clinical information and patient will be called to schedule an appointment with Dr. Solano.  Pt stated understanding.

## 2023-05-10 NOTE — TELEPHONE ENCOUNTER
----- Message from Lacey Landrum sent at 5/10/2023 12:23 PM CDT -----  Contact: Pt @ [phone]  Would like to receive medical advice.  Would they like a call back or a response via [facility]:  call back - leave voicemail if no answer   Additional information:      Pt is calling due to being referred to Dr. Solano from a provider in [place], a Dr. Anson GARCIA cardiologist. 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 Pt is wondering if Dr. Solano can see him but was advised that Dr. Solano is seeing pediatric patients.

## 2023-05-30 ENCOUNTER — TELEPHONE (OUTPATIENT)
Dept: PEDIATRIC CARDIOLOGY | Facility: CLINIC | Age: 78
End: 2023-05-30
Payer: MEDICARE

## 2023-05-30 NOTE — TELEPHONE ENCOUNTER
----- Message from Katia Beasley sent at 5/30/2023  9:52 AM CDT -----  Patient has an appt on 6/1 he needs to cancel wife has covid patient will call back at a later time to reschedule              Thank you  Scheduling

## 2024-01-30 ENCOUNTER — TELEPHONE (OUTPATIENT)
Dept: NEUROLOGY | Facility: CLINIC | Age: 79
End: 2024-01-30

## 2024-02-01 ENCOUNTER — TELEPHONE (OUTPATIENT)
Dept: NEUROLOGY | Facility: CLINIC | Age: 79
End: 2024-02-01
Payer: MEDICARE

## 2024-06-12 PROBLEM — G47.33 OBSTRUCTIVE SLEEP APNEA (ADULT) (PEDIATRIC): Status: ACTIVE | Noted: 2024-06-12

## 2024-09-07 PROBLEM — E66.3 OVERWEIGHT WITH BODY MASS INDEX (BMI) OF 27 TO 27.9 IN ADULT: Status: ACTIVE | Noted: 2024-09-07

## 2024-10-09 DIAGNOSIS — R26.81 UNSTEADY GAIT: Primary | ICD-10-CM

## 2024-11-08 ENCOUNTER — DOCUMENTATION ONLY (OUTPATIENT)
Dept: REHABILITATION | Facility: OTHER | Age: 79
End: 2024-11-08
Payer: MEDICARE

## 2024-11-08 ENCOUNTER — TELEPHONE (OUTPATIENT)
Dept: ADMINISTRATIVE | Facility: OTHER | Age: 79
End: 2024-11-08
Payer: MEDICARE

## 2024-11-08 NOTE — PROGRESS NOTES
On 11/05/2024, PTEmigdio, called offices of [facility] (recent OT and PT referrals for [facility]) to request a referral specific to [facility]P for med screen ([id number]).  Nurse took message.      Awaiting updated referral to being FR[facility] programming for patient.       Emigdio Yost, PT 11/08/2024

## 2024-11-08 NOTE — TELEPHONE ENCOUNTER
Left voice message for patient to return call to schedule appointment from referral to [facility] department.Contact number provided, and My Chart message to be sent.  Angy CORONEL [phone]

## 2024-11-15 DIAGNOSIS — R26.81 UNSTEADY GAIT: Primary | ICD-10-CM

## 2024-12-02 ENCOUNTER — OFFICE VISIT (OUTPATIENT)
Dept: PAIN MEDICINE | Facility: OTHER | Age: 79
End: 2024-12-02
Payer: MEDICARE

## 2024-12-02 DIAGNOSIS — G90.1 DYSAUTONOMIA: Primary | ICD-10-CM

## 2024-12-02 DIAGNOSIS — R26.81 UNSTEADY GAIT: ICD-10-CM

## 2024-12-02 DIAGNOSIS — R26.89 IMBALANCE: ICD-10-CM

## 2024-12-02 PROCEDURE — 1159F MED LIST DOCD IN RCRD: CPT | Mod: CPTII,,, | Performed by: NURSE PRACTITIONER

## 2024-12-02 PROCEDURE — 1160F RVW MEDS BY RX/DR IN RCRD: CPT | Mod: CPTII,,, | Performed by: NURSE PRACTITIONER

## 2024-12-02 PROCEDURE — 99205 OFFICE O/P NEW HI 60 MIN: CPT | Mod: ,,, | Performed by: NURSE PRACTITIONER

## 2024-12-02 NOTE — PROGRESS NOTES
[facility]    Initial Medical Screening Visit Note    Subjective:       Chief Complaint Requiring Rehabilitation: Chronic Pain    Consulted by: Cedrick Alcantara MD      HPI:     Brooks Joya is a 79 y.o. male who presents today for the [facility] Medical Screening Visit. Brooks Joya was referred by Cedrick Alcantara MD with associated diagnosis of chronic fatigue and dysautonomia.     He began experiencing symptoms of dizziness in August while in [place], [place]. He initially had treatment there with limited relief. He tried for a year to find a specialist for treatment. He was evaluated by the [facility] in 2021. He was diagnosed with Dysautonomia and POTS at that time. He began receiving IVIG infusions. This has gradually improved his symptoms. He was previously experiencing constant dizziness. He now has sporadic bouts of this. In September, he was reevaluated by [facility]'s Dysautonomia clinic. His testing results showed normal ranges. They recommend continuing infusion. He has established care with Dr. Alcantara through . He will continue to receive IVIG every 3 weeks. He does note frequent falls. He has fallen twice in the last month. He also notes fatigue. He will need frequent breaks. He cannot tolerate significant heat. He will not play golf in the summer. He was recently found to be hyperreflexic. He did have a cervical MRI done outside of [facility]. He was evaluated by Dr. Castanon in Neurosurgery who is obtaining further work up. Of note, he does have a positive GREG. See below for additional details.            Chronic Pain History:      Ambulatory status:  Uses walker for ambulation      Balance problems?  Yes, twice in the last month       Fainting/Syncope/POTS?  Yes, POTS and Dysautonomia   Currently on infusions.       Physical Therapy?  Last PT in 2022  Also completed vestibular OT in 2022      Exercise Habits?  Yes, 2-3 days a week   Also plays  golf on Wednesdays and Fridays       Alternative/Complementary Therapies (massage, yoga, adam chi, acupuncture, guided imagery, chiropractic care, hypnosis, biofeedback, herbs, supplements, dietary approaches)?  No       Current pain medications:  Tylenol   Ibuprofen       Pain management injections:  N/A      Relevant surgeries:  2010- Right hip replacement  January 2022- Left hip replacement  April 2022- Left knee replacement  Knuckle replacements-  within last 10 years     Any upcoming surgeries or procedures? 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 Yes, inspire device      Sleep Aids? Melatonin       Mental Health Hx/Tx  Anxiety   Sees Psychiatry   Currently taking Zoloft and Klonopin  Was seeing a therapist but stopped     Stress/Stress Mgmt comments: golf    Inpatient Psychiatric Tx? Yes, at age 10     SI? No       Social Hx/Connections:   , 30 years (2nd wife)   2 children   3 stepchildren  Grand-dog       Health Habits:      Smoking Status: No       Alcohol use: No      Illegal/illicit drug use: No      Substance abuse hx?: No             Past Medical History:   Diagnosis Date    CKD (chronic kidney disease), stage II     General anesthetics causing adverse effect in therapeutic use     slow to arouse after sleep endoscopy    Hyperlipidemia     Hypertension     Lung nodule     POTS (postural orthostatic tachycardia syndrome)     8/2024 testing at [facility] shows no POTS dx    RBD (REM behavioral disorder)     Sensory ataxia     Sleep apnea     no CPAP    Small fiber neuropathy     Thyroid disease     Hashimoto's       Past Surgical History:   Procedure Laterality Date    CARDIAC CATHETERIZATION      no stents    COLONOSCOPY      EYE SURGERY Bilateral     lasik and cataracts    INSERTION, NEUROSTIMULATOR, HYPOGLOSSAL N/A 9/7/2024    Procedure: INSERTION,NEUROSTIMULATOR,HYPOGLOSSAL -  [employer];  Surgeon: Elijah Aguilar MD;  Location: [facility] OR;  Service: ENT;  Laterality: N/A;    JOINT REPLACEMENT Bilateral     hips    knuckle replacement surgery      x3    PATENT FORAMEN OVALE CLOSURE      SLEEP ENDOSCOPY, DRUG-INDUCED N/A 6/12/2024    Procedure: SLEEP ENDOSCOPY,DRUG-INDUCED;  Surgeon: Elijah Aguilar MD;  Location: [facility] OR;  Service: ENT;  Laterality: N/A;    TONSILLECTOMY      TOTAL KNEE ARTHROPLASTY Left        Review of patient's allergies indicates:   Allergen Reactions    Sulfa (sulfonamide antibiotics) Hives       Current Outpatient Medications   Medication Sig Dispense Refill    allopurinol (ZYLOPRIM) 300 MG tablet Take 300 mg by mouth once daily.      aspirin (ECOTRIN) 81 MG EC tablet TAKE 1 TABLET BY MOUTH EVERY DAY 90 tablet 1    atorvastatin (LIPITOR) 40 MG tablet Take 40 mg by mouth every evening.      celecoxib (CELEBREX) 200 MG capsule Take 1 capsule (200 mg total) by mouth 2 (two) times daily. (Patient not taking: Reported on 6/10/2024) 180 capsule 3    clonazePAM (KLONOPIN) 0.5 MG tablet Take 1 mg by mouth once daily.      coenzyme Q10 200 mg capsule Take 200 mg by mouth once daily.      diazepam (VALIUM) 2 MG tablet Take 5 mg by mouth every 6 (six) hours as needed.      droxidopa 300 mg Cap Take 1 capsule by mouth 3 (three) times daily. (Patient not taking: Reported on 9/6/2024)      finasteride (PROSCAR) 5 mg tablet Take 5 mg by mouth once daily.      gabapentin (NEURONTIN) 300 MG capsule 1 to 2 tabs po tid for pain 90 capsule 11    HYDROcodone-acetaminophen (NORCO) 7.5-325 mg per tablet Take 1 tablet by mouth every 4 (four) hours as needed for Pain. 20 tablet 0    Lactobacillus rhamnosus GG (CULTURELLE) 10 billion cell capsule Take 1 capsule by mouth once daily.      levothyroxine (SYNTHROID) 150 MCG tablet Take 150 mcg by mouth every evening.      omega 3-dha-epa-fish oil 35-25-5-113.5 mg Chew Take 1 capsule by mouth 2 (two) times a day.      ondansetron (ZOFRAN) 8 MG  tablet Take 1 tablet (8 mg total) by mouth 2 (two) times daily. 10 tablet 0    potassium citrate (UROCIT-K) 10 mEq TbSR Take 10 mEq by mouth 2 (two) times daily with meals.      predniSONE (DELTASONE) 20 MG tablet Take 2 tablets (40 mg total) by mouth once daily. 14 tablet 0    pyridostigmine (MESTINON) 60 mg Tab Take 60 mg by mouth 3 (three) times daily.      ramipriL (ALTACE) 10 MG capsule Take 1 capsule (10 mg total) by mouth once daily. (Patient taking differently: Take 5 mg by mouth 2 (two) times daily.) 90 capsule 3    sertraline (ZOLOFT) 100 MG tablet Take 100 mg by mouth every evening.      triamterene-hydrochlorothiazide 37.5-25 mg (DYAZIDE) 37.5-25 mg per capsule TAKE 1 CAPSULE BY MOUTH EVERY DAY OR AS DIRECTED (Patient not taking: Reported on 6/10/2024) 90 capsule 3     No current facility-administered medications for this visit.       Family History   Problem Relation Name Age of Onset    Hypertension Mother      Heart attack Father         Social History     Socioeconomic History    Marital status:    Tobacco Use    Smoking status: Never    Smokeless tobacco: Never    Tobacco comments:     Quit 60 years ago   Substance and Sexual Activity    Alcohol use: Not Currently    Drug use: Never     Social Drivers of Health     Financial Resource Strain: Low Risk  (9/11/2024)    Received from [facility] [employer]    Overall Financial Resource Strain (CARDIA)     Difficulty of Paying Living Expenses: Not hard at all   Food Insecurity: No Food Insecurity (9/11/2024)    Received from [facility] [employer]    Hunger Vital Sign     Worried About Running Out of Food in the Last Year: Never true     Ran Out of Food in the Last Year: Never true   Transportation Needs: No Transportation Needs (9/11/2024)    Received from [facility] [employer]    PRAPARE - Transportation     Lack of Transportation (Medical): No     Lack of Transportation (Non-Medical): No   Physical Activity: Sufficiently Active (9/11/2024)    Received from [facility] [employer]     Exercise Vital Sign     Days of Exercise per Week: 3 days     Minutes of Exercise per Session: 60 min   Stress: No Stress Concern Present (9/11/2024)    Received from [facility] Occupational [facility] - Occupational Stress Questionnaire     Feeling of Stress : Not at all              Objective:        GEN: Well developed, well nourished. No acute distress. Fully alert, oriented, and appropriate. Speech normal livia.   PSYCH: Normal affect. Thought content appropriate.  CHEST: Breathing symmetric. No audible wheezing.  SKIN: Warm, dry. No rash or discoloration on exposed areas.   NEURO/MUSCULOSKELETAL:  Cervical: ROM full and painless; No pain with palpation over neck and shoulder musculature ; 5/5 UE strength; gross sensation intact bilaterally; Negative Gerber's bilaterally.  Lumbar: ROM limited and painful.  5/5 LE strength bilaterally; +3 patellar reflexes, decreased sensation to RLE.   SLR negative bilaterally (sitting)       Imaging:      Spine imaging outside of [facility], unable to review    Assessment:     Encounter Diagnoses   Name Primary?    Dysautonomia Yes    Unsteady gait     Imbalance        Plan:     Diagnoses and all orders for this visit:    Dysautonomia  -     Ambulatory referral/consult to Physical/Occupational Therapy; Future  -     Ambulatory referral/consult to Physical/Occupational Therapy; Future  -     Ambulatory referral/consult to Psychiatry; Future    Unsteady gait  -     Ambulatory referral/consult to [facility]  -     Ambulatory referral/consult to Physical/Occupational Therapy; Future  -     Ambulatory referral/consult to Physical/Occupational Therapy; Future  -     Ambulatory referral/consult to Psychiatry; Future    Imbalance  -     Ambulatory referral/consult to Physical/Occupational Therapy; Future  -     Ambulatory referral/consult to Physical/Occupational Therapy; Future  -     Ambulatory referral/consult to Psychiatry; Future          Brooks Joya is a 79 y.o. male with the above diagnoses.      Education about pain and the chronic pain cycle was provided today. Discussed the importance of multimodal and multidisciplinary management of chronic pain with a focus on both pain relief and function. Discussed how our team provides education and training aimed at improving physical function, emotional health, stress and pain coping skills.Treatment is designed to build confidence in physical activity and ADLs and in your ability to control and manage your pain.     Recommend proceeding with PT/OT screening visit for further evaluation of personal goals, functional status and limitations prior to enrollment in the program.     I spent a total of 60 minutes with the patient, and greater than 50% of the time was spent in counseling and education.     The above plan and management options were discussed at length with patient. Patient is in agreement with the above and verbalized understanding. It will be communicated with the referring physician via electronic record, fax, or mail.    Jinny Hernandez NP  12/02/2024

## 2024-12-06 NOTE — PROGRESS NOTES
NOTE: This is a duplicate copy utilized for reassessment purposes & continuity of care.  See pt's plan of care for co-signed copy.    [facility]  Functional Restoration Program  Physical Therapy Initial Evaluation    Date: 12/9/2024   Name: Brooks Joya  Clinic Number: [id number]    Therapy Diagnosis:   Encounter Diagnoses   Name Primary?    Dysautonomia     Unsteady gait     Imbalance     Impaired functional mobility, balance, gait, and endurance Yes     Physician: Jinny Hernandez, NP    Physician Orders: PT Eval and Treat  Medical Diagnosis from Referral:   Diagnosis   G90.1 (ICD-10-CM) - Dysautonomia   R26.81 (ICD-10-CM) - Unsteady gait   R26.89 (ICD-10-CM) - Imbalance     Evaluation Date: 12/9/2024  Authorization Period Expiration: 12/2/25  Plan of Care Expiration: 4/30/25  Visit # / Visits authorized: 1/ 1 (Needs 6MWT)  FOTO: 1/ 3     Precautions: Standard and Fall and Immunocompromise    Time In: 2:30pm  Time Out: 3:40pm  Total Appointment Time (timed & untimed codes): 70 minutes      Subjective     Date of onset: 2020    Patient reported history of current condition:     From referring provider's note:   1. Dysautonomia:  Reviewed records in detail.  Per his last evaluation, his POTS seems to have improved, likely due to the effect of IVIG.  He has weaned off Droxidopa and his noticed no difference in his symptoms.  Today, we can discontinue Mestinon to see if this makes a difference.  We will plan on continuing the infusions as he feels this has been the most beneficial for his GREG positive/GM2 positive IVIG responsive symptoms. I also counseled the patient that moving forward, he needs to transition away from the sick role to allow him to live more normally. To this end, I will place a referral to the [facility] functional restoration program.  If he feels comfortable, he can discontinue wearing his mask as I do not find any evidence of immune compromise.  If anything, IVIG helps  boost his immune system.     2. Cervical myelopathy:  Patient was relatively hyperreflexic, particularly for someone with neuropathy.  MRI of the neck is necessary.  He reports a prior MRI of the neck showing arthritic changes.  Will repeat to ensure that he does not have any cervical cord compression, which may be contributing to his unsteady gait     3. Possible small fiber neuropathy: Patient does have some evidence of subjective pinprick loss.  B12 is previously normal.  He is not diabetic or prediabetic.     4. Dizziness: Likely multifactorial.  Hopefully his symptoms will improve with PT/OT     RTC 3 mos, sooner if problem     From Jinny Hernandez:  He began experiencing symptoms of dizziness in August while in [place], [place]. He initially had treatment there with limited relief. He tried for a year to find a specialist for treatment. He was evaluated by the [facility] in 2021. He was diagnosed with Dysautonomia and POTS at that time. He began receiving IVIG infusions. This has gradually improved his symptoms. He was previously experiencing constant dizziness. He now has sporadic bouts of this. In September, he was reevaluated by [facility]'s Dysautonomia clinic. His testing results showed normal ranges. They recommend continuing infusion. He has established care with Dr. Alcantara through . He will continue to receive IVIG every 3 weeks. He does note frequent falls. He has fallen twice in the last month. He also notes fatigue. He will need frequent breaks. He cannot tolerate significant heat. He will not play golf in the summer. He was recently found to be hyperreflexic. He did have a cervical MRI done outside of [facility]. He was evaluated by Dr. Castanon in Neurosurgery who is obtaining further work up. Of note, he does have a positive GREG.     Travon reports: Retired in his late 60's/early 70's, was playing more golf, started going to [place] for the gill, had not child or money problems, then on 8/5/20 on golf  "course had sudden attack of motion imbalance and dizziness and heat intolerance that has continued since. Dizziness got better with IVIG treatment, but chronic fatigue and motion imbalance are most notable still. Falling frequently and endurance are an issue, hence he uses a rolling walker. He also reports Small Fiber Polyneuropathy dx from [facility] but was not given particular treatment for this. Has had PT for hips, knees, hands in past, and vestibular PT recently which did very little for him, per patient.    His current neurologist, Dr. Alcantara, recommended stopping dysautonomia drugs he was on and he hasn't noticed any difference since stopping them (happy to be off them). Also recommended getting rid of RW and golfing more, which he is happy about. Neurosurgeon thinks cord compression in C-spine may be cause of leg symptoms, has surgical consult soon.    He golfs frequently and goes to the gym weekly. "I feel like a dish rag by the end of a round of golf, but I'm not going to stay away from it.  "Fear of uncertainty was the worst part of this."    Imaging: see imaging section    Prior Therapy: see above  Social History: lives with their spouse  Occupation: retired  Prior Level of Function: no limitations  Current Level of Function: limited walking, standing, balance capacity    Pain:      Current 2/10, worst 5/10, best 0/10   Location: neck, bilateral shoulders    Patient's FRP goals:   Be able to golf regularly without falling  Prolong functionality  Reduce falls    Medical History:   Past Medical History:   Diagnosis Date    CKD (chronic kidney disease), stage II     General anesthetics causing adverse effect in therapeutic use     slow to arouse after sleep endoscopy    Hyperlipidemia     Hypertension     Lung nodule     POTS (postural orthostatic tachycardia syndrome)     8/2024 testing at [facility] shows no POTS dx    RBD (REM behavioral disorder)     Sensory ataxia     Sleep apnea     no CPAP    Small " fiber neuropathy     Thyroid disease     Hashimoto's       Surgical History:   Brooks Joya  has a past surgical history that includes Cardiac catheterization; Patent foramen ovale closure; Joint replacement (Bilateral); Tonsillectomy; Eye surgery (Bilateral); Colonoscopy; Total knee arthroplasty (Left); knuckle replacement surgery; sleep endoscopy, drug-induced (N/A, 6/12/2024); and insertion, neurostimulator, hypoglossal (N/A, 9/7/2024).    Medications:   Brooks has a current medication list which includes the following prescription(s): allopurinol, aspirin, atorvastatin, clonazepam, coenzyme q10, diazepam, finasteride, hydrocodone-acetaminophen, lactobacillus rhamnosus gg, levothyroxine, omega 3-dha-epa-fish oil, ondansetron, potassium citrate, prednisone, pyridostigmine, and sertraline.    Allergies:   Review of patient's allergies indicates:   Allergen Reactions    Sulfa (sulfonamide antibiotics) Hives        Objective     Postural examination:  Seated: Forward head  Standing: reduce lumbar lordosis    Range of Motion - Cervical   AROM AROM AROM    12/9/2024 Reassessment  Reassessment   Flexion      Extension      Side bending Right      Side bending Left      Rotation Right      Rotation Left        Range of Motion - Lumbar   12/9/2024      ROM Loss ROM Loss ROM Loss   Flexion      Extension      Side bending Right      Side bending Left      Rotation Right      Rotation Left        Strength Testing   12/9/2024 Reassessment Reassessment   Motion Tested         Lower Extremity R L R L R L   Hip Flexion 5/5 5/5       Hip Extension 4+/5 4+/5       Hip Abduction 4/5 4/5       Hip Adduction 4/5 4/5       Knee Extension 5/5 5/5       Knee Flexion 4+/5 4+/5       Ankle Dorsiflexion 5/5 5/5         Functional Testing     12/9/2024 Reassessment  Reassessment   Timed Up and Go 8.85 sec  8.37 sec sec sec   Single Limb Stance R LE 10.69 sec sec sec   Single Limb Stance L LE 5 sec sec sec   2 Minute Walk Test  meters  feet feet   6 Minute Walk Test TBD meters feet feet   Self Selected Walking Speed TBD m/sec m/sec m/sec     GAIT:  Assistive Device used: none and RW  Level of Assistance: independent  Patient displays the following gait deviations:  unsteady gait and occasional midline crossing (scissoring) .     Minimum standards/norms:    TUG:  < 13.5 seconds/ Males 7.3 sec, Females 8.1 sec  SLS:  26-29 sec  Ages 20-69  Self Selected Walking Speed:  .4-.8m/sec  Limited community ambulator                                                   .8- 1.2  Community ambulator                                                    1.2 m/sec and above  Able to safely cross streets        Limitation/Restriction for FOTO Neck Survey    Therapist reviewed FOTO scores for Brooks Joya on 12/9/2024.   FOTO documents entered into [email or web address] - see Media section.    Limitation Score 12/9/2024: TBD%    Predicted Score: TBD%             TREATMENT     Total Treatment time (time-based codes) separate from Evaluation: 20 minutes     Travon received the treatments listed below:        Travon received therapeutic activities to improve functional performance for 20 minutes, including:  PT education:  Physical & psychological viscous pain cycles (see patient instructions 12/9/2024)  Role of consistent activity (graded exposure) to break the physical cycle  Importance of education & behavioral changes that promote intrinsic patient motivation to help break the psychological cycle  Impact on pt's functional goals when breaking each one of these cycles.    Impact central sensitization has on the sensory system in the chronic pain population  Autonomic NS Education  Reviewed sympathetic and parasympathetic systems  Reviewed neuroplasticity and central sensitization, and their roles in dysautonomia      PATIENT EDUCATION AND HOME EXERCISES     Education provided:   - FRP structure  - Autonomic nervous system    Written Home Exercises Provided: Patient instructed to  cont prior HEP. Exercises were reviewed and Travon was able to demonstrate them prior to the end of the session.  Travon demonstrated good  understanding of the education provided. See EMR under Patient Instructions for information provided during therapy sessions.    ASSESSMENT   Brooks is a 79 y.o. male referred to outpatient Physical Therapy with a medical diagnosis of dysautonomia, unsteady gait and imbalance. Patient presents today with impaired balance, stability with functional transfers (sit to stand), and impaired gait impacting his functional capacity for community navigation and activities of choice. He additionally presents with neck and shoulder pain impacting his quality of life and sleep. Based on the above history and physical examination an active physical therapy program within a multi-disciplinary setting is recommended.  Pt will benefit from skilled outpatient physical therapy to address the deficits listed below in the chart, provide pt/family education and to maximize pt's level of independence in the home and community environment.     Plan of care discussed with patient: Yes  Pt's spiritual, cultural and educational needs considered and patient is agreeable to the plan of care and goals as stated below:     Pt prognosis is Good.   Anticipated Barriers for therapy: chronic nature of issues, psychosocial factors, central sensitization    Medical Necessity is demonstrated by the following  History  Co-morbidities and personal factors that may impact the plan of care Co-morbidities:   See PMhx    Personal Factors:   coping style     high   Examination  Body Structures and Functions, activity limitations and participation restrictions that may impact the plan of care Body Regions:   neck  back  lower extremities  upper extremities    Body Systems:    balance  gait  transfers  motor control    Participation Restrictions:   None    Activity limitations:   Learning and applying knowledge  no  deficits    General Tasks and Commands  no deficits    Communication  no deficits    Mobility  walking  using transportation (bus, train, plane, car)  driving (bike, car, motorcycle)    Self care  washing oneself (bathing, drying, washing hands)  caring for body parts (brushing teeth, shaving, grooming)    Domestic Life  shopping  doing house work (cleaning house, washing dishes, laundry)  assisting others    Interactions/Relationships  no deficits    Life Areas  no deficits    Community and Social Life  no deficits         high   Clinical Presentation unstable clinical presentation with unpredictable characteristics high   Decision Making/ Complexity Score: high       GOALS: Pt is in agreement with the following goals:  Goal Progress Towards Goal   SHORT Term: In 3 weeks, pt will:    Verbalize & demonstrate good understanding of resisted training with 3-1-3 second rep for ykkzworhic-snwqxaspt-fpledxipy contraction to encourage full muscle recruitment for strength & endurance. Initiated 12/9/2024   Verbalize & demonstrate good understanding of home stretch routine to improve AROM, help decrease pain & improve mobility. Initiated 12/9/2024   Demonstrate proper supine or seated diaphragmatic breathing with decreased chest excursion & emphasis on full abdominal excursion & increased expiration time to promote muscle relaxation & increased parasympathetic activity to improve patient tolerance to activities. Initiated 12/9/2024   Verbalize good understanding of importance of proper posture in resisted exercise, functional activities & ADL's in order to help reduce postural strain, promote proper breathing. Initiated 12/9/2024   Demonstrate good understanding of full body resisted exercises w/ use of pictures &/or verbal cues to promote independence w/ exercise at the end of the program. Initiated 12/9/2024   Verbalize plan for continuing resisted exercises w/ specific location (i.e. commercial gym, home, community  "fitness center)  to incorporate all major muscle groups to maintain & progress therapeutic functional improvements. Initiated 12/9/2024   Verbalize difference between  "hurt vs harm"  to demonstrate understanding of fear avoidance in pain cycle.  Initiated 12/9/2024       Midterm: In 8 weeks, pt will:    Verbalize good understanding of activities planning/scheduling (stretching, mindfulness, resisted training, & cardio) prescribed by PT/OT following the end of the program to sustain & progress functional improvements. Initiated 12/9/2024   Perform selected resisted training w/ minimal to no cuing in therapy session to be independent w/ resisted strengthening. Initiated 12/9/2024   Demonstrate improved symptom self-management w/ posture/positioning and mechanical movements and/or implementation of appropriate modalities throughout a typical day.  Initiated 12/9/2024   Demonstrate independence w/ home stretch routine to improve AROM, help decrease pain & improve mobility. Initiated 12/9/2024       Long Term: In 12 weeks, pt will:    Perform selected cardio & resisted training independently to continue safe regular performance of daily exercise. Initiated 12/9/2024   Improve 2 Minute Walk Test (MWT) to TBD meters and 6 MWT to TBD meters or greater to improve gait speed and mobility. Initiated 12/9/2024   Perform single leg stance 20 seconds or greater bilaterally to improve safety and balance in ADLs. Initiated 12/9/2024   Demonstrate Timed Up & Go (with appropriate assistive device, if necessary) of 8 seconds or less to decrease fall risk and improve functional mobility. Initiated 12/9/2024   Score TBD % or greater on Cervical FOTO to indicate significant functional improvements in impaired functions secondary to pain. Initiated 12/9/2024    Initiated 12/9/2024       PLAN   Plan of care Certification: 12/9/2024 to 4/30/25.    Outpatient Physical Therapy 3 times weekly for 12 weeks to include the following " interventions: Gait Training, Manual Therapy, Moist Heat/ Ice, Neuromuscular Re-ed, Patient Education, Therapeutic Activities, and Therapeutic Exercise. Patient may need to defer treatment pending acquisition of new home and/or need for neurosurgery and orthopedic surgery for shoulders in coming months.    Kash Arboleda, PT

## 2024-12-06 NOTE — PROGRESS NOTES
NOTE: This is a duplicate copy utilized for reassessment purposes & continuity of care.    See pt's plan of care for co-signed copy.     [facility]   Occupational Therapy Initial Evaluation    Encounter Date: 12/9/2024  Name: Brooks Joya  Clinic Number: [id number]    Therapy Diagnosis:   Encounter Diagnoses   Name Primary?    Dysautonomia Yes    Unsteady gait     Imbalance        Referring Provider: Jinny Hernandez NP    Physician Orders: eval and treat; FRP  Medical Diagnosis: Dysautonomia [G90.1], Unsteady gait [R26.81], Imbalance [R26.89]   Evaluation Date: 12/9/2024  Insurance Authorization Period Expiration: 12/31/2024  Plan of Care Certification Period: 2/3/2025   Visit # / Visits authorized: 1/1  FOTO completed: 1/3    Precautions:  Standard, fall, POTS/dysautonomina    Time In:1330  Time Out: 1430  Total Appointment Time (timed & untimed codes): 60 minutes    Subjective   History of current condition, based on chart review and Travon's report:   He began experiencing symptoms of dizziness in August while in [place], [place]. He initially had treatment there with limited relief. He tried for a year to find a specialist for treatment. He was evaluated by the [facility] in 2021. He was diagnosed with Dysautonomia and POTS at that time. He began receiving IVIG infusions and has noted benefit. This has gradually improved his symptoms. He was previously experiencing constant dizziness. He now has sporadic bouts of this. In September, he was reevaluated by [facility]'s Dysautonomia clinic. His testing results showed normal ranges. They recommend continuing infusion. He has established care with Dr. Alcantara through . He will continue to receive IVIG every 3 weeks. He does note frequent falls. He has fallen twice in the last month. He also notes fatigue. He will need frequent breaks. He cannot tolerate significant heat. He will not play golf in the summer. He  "was recently found to be hyperreflexic. He did have a cervical MRI done outside of [facility]. He was evaluated by Dr. Castanon in Neurosurgery who is obtaining further work up for potential surgery on "arthritic neck." He also has upcoming apt to evaluate B shoulder 2* concern for rotator cuff injury or arthritic pain. Of note, he does have a positive GREG.      From referring provider's note:   1. Dysautonomia:  Reviewed records in detail.  Per his last evaluation, his POTS seems to have improved, likely due to the effect of IVIG.  He has weaned off Droxidopa and his noticed no difference in his symptoms.  Today, we can discontinue Mestinon to see if this makes a difference.  We will plan on continuing the infusions as he feels this has been the most beneficial for his GREG positive/GM2 positive IVIG responsive symptoms. I also counseled the patient that moving forward, he needs to transition away from the sick role to allow him to live more normally. To this end, I will place a referral to the [facility] functional restoration program.  If he feels comfortable, he can discontinue wearing his mask as I do not find any evidence of immune compromise.  If anything, IVIG helps boost his immune system.     2. Cervical myelopathy:  Patient was relatively hyperreflexic, particularly for someone with neuropathy.  MRI of the neck is necessary.  He reports a prior MRI of the neck showing arthritic changes.  Will repeat to ensure that he does not have any cervical cord compression, which may be contributing to his unsteady gait     3. Possible small fiber neuropathy: Patient does have some evidence of subjective pinprick loss.  B12 is previously normal.  He is not diabetic or prediabetic.     4. Dizziness: Likely multifactorial.  Hopefully his symptoms will improve with PT/OT     RTC 3 mos, sooner if problem    Brooks Statement: "I've had some frightening falls" "I like the idea of fatigue management" also shared about potential " "upcoming move, that would limit availability to participate in therapy at this time.  "I don't know if there is any therapy that is going to take away my fear. And I've done my fair share of therapies" re: fear of falling.    He golfs frequently and goes to the gym weekly. "I feel like a dish rag by the end of a round of golf, but I'm not going to stay away from it. "Fear of uncertainty was the worst part of this."     Interested in energy management, fatigue management, pacing and scheduling throughout the day. Also states that he is not interested in group setting of FRP, and prefers 1:1 sessions. States that would be interested in improving balance with RW. Primary goals being Fatigue management, fear of falling, LB dressing/improved ADL performance.     Occupational Profile  Social and family Hx: , 30 years (2nd wife)   2 children, she has 3 children. Aussie-doodle, difficulty taking her on walks  Home access: 2 STH, master is on 2nd floor, walk in shower.    Planning a move with master on 1st floor.  DME: RW for past 4 years 2* balance issues  Driving status: some neck discomfort but no complaints  Occupations/hobbies/homemaking: golfing, walking dog, enjoys going out to dinner. Limits public social outings, but doesn't feel like outings are retricted unless heat or high physicality is demanded.   Working presently: Retired Medical/Commercial Floor Contractor     Prior Level of Function: Activities patient can no longer perform/has great difficulty with include: washing back, LB dressing, golf playing, completing more than 1 major task in a day without "paying for it later."     Prior Therapy: Last PT in 2022, Also completed vestibular OT in 2022  Current Exercise Routine: gym 2-3 days a week (45 min on track, and 30 min in weight room), Also plays golf on Wednesdays and Fridays   Current Self-care Routine: limited   Falls: x1/past month, endorses that they are "quick" onset, states that they are not " "typically a result of dizziness.   Disturbed sleep: Yes sleep apnea; had "inspire device" installed past week.      Pain:      Endorses intermittent neck pain and shoulder, currently at 0/10.   Location: neck and B shoulder     Balance/Fatigue Issue  "The room doesn't move, I do"   Description: increased balance issues/falls  Functional Exacerbations: heat intolerant, extended exertion  Easing Factors: rest, IVIG, exercise    Patient Specific Activities based on Personal goals:  Activity  (To be rated first session after eval)    Performance Satisfaction   golf     2.  Cleaning back     3.  LB dressing     4.  packing     5.  Shaving the back of head          Total Score     Ratin-10; unable/unsatisfied - Able/Satisfied    Medical History:   Past Medical History:   Diagnosis Date    CKD (chronic kidney disease), stage II     General anesthetics causing adverse effect in therapeutic use     slow to arouse after sleep endoscopy    Hyperlipidemia     Hypertension     Lung nodule     POTS (postural orthostatic tachycardia syndrome)     2024 testing at [facility] shows no POTS dx    RBD (REM behavioral disorder)     Sensory ataxia     Sleep apnea     no CPAP    Small fiber neuropathy     Thyroid disease     Hashimoto's        Surgical History:   Brooks  has a past surgical history that includes Cardiac catheterization; Patent foramen ovale closure; Joint replacement (Bilateral); Tonsillectomy; Eye surgery (Bilateral); Colonoscopy; Total knee arthroplasty (Left); knuckle replacement surgery; sleep endoscopy, drug-induced (N/A, 2024); and insertion, neurostimulator, hypoglossal (N/A, 2024).    Medications:   Brooks has a current medication list which includes the following prescription(s): allopurinol, aspirin, atorvastatin, clonazepam, coenzyme q10, diazepam, finasteride, hydrocodone-acetaminophen, lactobacillus rhamnosus gg, levothyroxine, omega 3-dha-epa-fish oil, ondansetron, potassium citrate, prednisone, " pyridostigmine, and sertraline.    Allergies:   Review of patient's allergies indicates:   Allergen Reactions    Sulfa (sulfonamide antibiotics) Hives       Objective     COGNITIVE Exam  Behaviors: engaged and pleasant  Memory: No Deficits noted  Safety awareness/insight to disability: normal insight and judgment  Coping skills/emotional control: active coping strategies and focus on alternative activites; during session Appropriate to situation.    Dominant hand: right    Active Range of Motion  Upper Extremity 12/9/2024    RUE LUE Comments   Hands WFL WFL    Wrist WFL WFL    Elbows WFL WFL    Shoulders Minimally Limited Minimally Limited B shoulder rotator cuff      Upper Extremity Strength - Manual Muscle Testing  Motion Tested 12/9/2024    Right Left  comments   Shoulder Flexion 5/5 5/5    Shoulder Extension 5/5 5/5    Shoulder Abduction 4/5 4/5 Pain in R/L shoulder   Shoulder IR 5/5 5/5    Shoulder ER 5/5 5/5    Elbow Flexion 5/5 5/5    Elbow Extension 5/5 5/5       Strength (in pounds, rung II, average of three trials)   12/9/2024   Right hand  57.3 lbs   Left hand  54 lbs   [norms for men aged 75+: R=65.7 +/-21.0; L=55.0 +/-17.0 (Mathiowetz et al, 1985)]     Functional Strength  Pile Testing: Lifting   12/9/2024 (lbs/HR/JIN)    Repetitive Floor to Waist      NP 2* time restraint    Repetitive Waist to Shoulder NP   1- Time Maximum   NP   Carry-2 Handed (40ft)  NP   Work demand Level  -   Reason for stop point -   Comments    The work demand level listed above is based on the physical performance screening test performed. More comprehensive physical testing integrated with clinical findings would be required to derived an accurate work capacity.     Balance  5 times sit-stand (adults 18-63 y/o) 12/9/2024   >12 sec= fall risk for general elderly  >16 sec= fall risk for Parkinson's disease  >10 sec= balance/vestibular dysfunction (<61 y/o)  >14.2 sec= balance/vestibular dysfunction (>61 y/o)  >12 sec= fall  risk for CVA 11.99 seconds    (with  and without UE used to push up from chair)   Comments Endorsed pushing through shoulders caused pain.     Endurance Testing: Modified Ramp Treadmill Test   12/9/2024   Minutes Completed  4 min   % Grades  10 %   Speed (mph)  2.1 mph   METS 5   HR 97 bpm   Miguel Rating Perceived Exertion Scale   4   Reason for stop point MIGUEL scale rating beyond recommended levels, Inability to maintain proper body mechanics, Decline in maintaining pace safely    Comments -     During physical testing, participation level was consistent.     Limitation/Restriction for FOTO NOC Survey    Therapist reviewed FOTO scores for Brooks Joya on 12/9/2024.   FOTO documents entered into [employer] - see Media section.    Limitation Score: 23%           Treatment   In addition to eval, Mr. Joya received the following treatments:    Dynamic functional therapeutic activities to improve functional performance for 30 minutes, including:  Education provided:   - proper posture and body mechanics.  - anticipated muscular soreness following lift testing and strategies for management including stretching, using ice, scheduled rest.  - Functional pain scale  - MIGUEL rate of perceived exertion scale.   - pain cycle  - pursed lip and diaphragmatic breathing techniques  - details of the Functional Restoration Program.     Written Home Exercises Provided: yes.Travon given handout re: miguel scale, pain cycle, z-lie, functional lifting mechanics, pursed lip and diaphragmatic breathing techniques.    Exercises were reviewed and Travon was able to demonstrate them prior to the end of the session. Travon demonstrated good  understanding of the education provided.     Educational materials can be found under patient instructions in the EMR.     No environmental, cultural, spiritual, developmental or education needs expressed or noted during evaluation today.     Assessment     Travon appears to be a good candidate for the  Functional Restoration clinic. Travon would benefit from Occupational Therapy intervention to address deficits. He experiences decreased independence with activities of roles of life, and appears open to education of how goal of chronic pain education program is to provide tools, education and training aimed at improving physical functioning, emotional health, stress and pain coping skills, to build confidence in physical activity and improve independence with ADLs and IADLs, and in the ability to control and manage symptoms. Travon did voice understanding that behavioral changes are needed to reach goals. Travon with pleasant, open affect and normal mood. He was able to participate in all aspects of assessment, although endorsed concerns about effiacy of potential therapeutic treatments 2* lack of success with PT/OT in the past to address balance and energy goals.  He was open to education regarding use of JIN scale to guide pacing. Due to the chronic nature of his issues and various co morbidities, he presents with an unstable clinical presentation which may limit his progress, but with adequate motivation to make behavioral changes.     Travon's prognosis is Fair due to chronicity of condition.    Travon is unable to fully participate in his work, recreational, and home-based activities because of decreased functional strength, decreased  AROM, decreased endurance, limited positional tolerance, fear of re-injury, and fear of increased fatigue. He will benefit from skilled outpatient Occupational Therapy to address the limitations and goals stated above and in the chart below, provide patient/family education, and to maximize patient's level of functional independence and meet functional goals.     Plan of care discussed with patient: Yes  Pt's spiritual, cultural and educational needs considered and patient is agreeable to the plan of care and goals as stated below:     Medical necessity is demonstrated by the  following problem list:  Profile and History Assessment of Occupational Performance Level of Clinical Decision Making Complexity Score   Occupational Profile:   Brooks Joya is a 79 y.o. male who lives with their spouse and is retired.Brooks Joya has difficulty with  ADLs and IADLs as listed previously, which  affecting his daily functional abilities. His main goal for therapy is improved pain management for completion of ADLs/IADLs.    Comorbidities:    has a past medical history of CKD (chronic kidney disease), stage II, General anesthetics causing adverse effect in therapeutic use, Hyperlipidemia, Hypertension, Lung nodule, POTS (postural orthostatic tachycardia syndrome), RBD (REM behavioral disorder), Sensory ataxia, Sleep apnea, Small fiber neuropathy, and Thyroid disease.    Medical and Therapy History Review:   Extensive Performance Deficits    Physical:  Joint Mobility  Muscle Power/Strength  Muscle Endurance   Strength  Pinch Strength  Proprioception Functions  Postural Control  Pain    Cognitive:  Safety Awareness/Insight to Disability  Emotional Control    Psychosocial:   Pain avoidance, social isolation   Social Interaction  Habits  Routines  Rituals  Family Support  Group Participation Clinical Decision Making:  moderate    Assessment Process:  Comprehensive Assessments    Modification/Need for Assistance:  Minimal-Moderate Modifications/Assistance    Intervention Selection:  Multiple Treatment Options       high  Based on PMHX, co morbidities , data from assessments and functional level of assistance required with task and clinical presentation directly impacting function.           Goals:    SHORT Term goals: In 3 weeks, patient will:  Status of goal:   Implements correct use of breathing techniques during functional lifting tasks, with minimal cues needed. Initiated   Utilizes JIN rate of exertion scale to pace performance with functional lifting tasks, and implements self-care  strategies during activity participation to manage pain. Initiated   Verbalize understanding of energy conservation and pacing strategies during daily habits, roles, and routines in order to improve tolerance for work and home demands.  Initiated   Completes 5x sit to stand test in 11 seconds or less to improve ability to participate in community mobility.   Initiated   Demonstrate increased positional tolerance to the level needed for work, home, and community activities (standing in cue at social event, managing supplies for outing, streneous IADL), as evidenced by having a METS level of 6. Initiated   Demonstrate proper body mechanics with functional lifting tasks (floor to waist, waist to shoulder, maximum lift, and box carry), via teach back method with minimal cues needed. Initiated   Demonstrate increased functional strength by lifting 10 lbs waist to shoulder for management of (I)ADL, including dressing and grooming, placing items in kitchen cabinets, folding towels, and/or managing suitcase when traveling.   Initiated   Demonstrate increased functional strength by lifting 10 lbs floor to waist to meet demand of work/home routine, including lifting groceries, managing laundry, and/or managing suitcase when traveling.   Initiated   Tolerate Home Activity Plan (HAP)/ Home Exercise Program (HEP) to promote safety and independence with ADL, with report of completion of at least 2 out of 4 days outside of program participation.  Initiated   Show improved perception of own abilities as evidenced by increase of FOTO scores to established MDC value and perception of performance ratings regarding personal long term goals.  Initiated       Long Term goals: In 9 weeks, patient will: Status of goal:   Report implementation of application of mindfulness, stretching, and other coping strategies for improved participation in daily routine. Initiated   Verbalize functional application of lifting techniques in daily life  (golfers lift, floor to waist, waist to shoulder). Initiated   Applies functional application of lifting techniques (golfer's lift, floor to waist, waist to shoulder) in activities of daily life, per patient report. Initiated   Demonstrate physical capacities consistent with a Sedentary-Light (#15 max, frequent lifting/carrying #10, 2.0 METS)  demand level, as needed to perform activities to be successful in roles of life, regarding Household Management and Community Participation. Initiated   Have an improvement of 3 points in 2/5 personal goals in rating of  performance.  Initiated   Show improved perception of own abilities as evidenced by increase of FOTO scores to established MC II value and perception of performance ratings regarding personal long term goals.  Initiated     Patient Specific Goals; to be met after 2 months of (I) application of tools/techniques learned.  Vocational: complete packing and moving with good management of energy.fatigue   Recreational : return to golf and gym with less guarding of energy resources    Daily Living Activities: LB dressing, washing back    Measured by patient's self-reported performance and satisfaction of personal FRP goals, listed above in subjective section.   Total Score = sum of the activity performance scores / number of activities  Minimum detectable change (90%CI) for average score = 2 points  Minimum detectable change (90%CI) for single activity score  = 3 points     Plan   Plan of care certification: 12/9/2024 to 2/9/2025    Outpatient occupational therapy, 2 times a week for 8 weeks to include the following interventions: Patient Education, Self- Care/Home-management strategies, Therapeutic Activities, Therapeutic Exercise and Therapeutic interventions that focus on cognitive function and compensatory strategies to manage the performance activities, followed by independent application of FRP tools and techniques learned, to work towards improved performance  regarding personal goals with return 2 month post cohort completion for taking of measures and review of plan of care..    Amelia Moses, OT, LOTR, 12/9/2024  Occupational Therapy

## 2024-12-09 ENCOUNTER — CLINICAL SUPPORT (OUTPATIENT)
Dept: REHABILITATION | Facility: OTHER | Age: 79
End: 2024-12-09
Payer: MEDICARE

## 2024-12-09 DIAGNOSIS — G90.1 DYSAUTONOMIA: Primary | ICD-10-CM

## 2024-12-09 DIAGNOSIS — R26.89 IMBALANCE: ICD-10-CM

## 2024-12-09 DIAGNOSIS — R26.81 UNSTEADY GAIT: ICD-10-CM

## 2024-12-09 DIAGNOSIS — Z74.09 IMPAIRED FUNCTIONAL MOBILITY, BALANCE, GAIT, AND ENDURANCE: Primary | ICD-10-CM

## 2024-12-09 DIAGNOSIS — G90.1 DYSAUTONOMIA: ICD-10-CM

## 2024-12-09 PROBLEM — R52 PAIN AGGRAVATED BY ACTIVITIES OF DAILY LIVING: Status: ACTIVE | Noted: 2024-12-09

## 2024-12-09 PROCEDURE — 97163 PT EVAL HIGH COMPLEX 45 MIN: CPT

## 2024-12-09 PROCEDURE — 97530 THERAPEUTIC ACTIVITIES: CPT

## 2024-12-09 PROCEDURE — 97167 OT EVAL HIGH COMPLEX 60 MIN: CPT

## 2024-12-11 NOTE — PLAN OF CARE
[facility]  [facility]   Occupational Therapy Initial Evaluation    Encounter Date: 12/9/2024  Name: Brooks Joya  Clinic Number: [id number]    Therapy Diagnosis:   Encounter Diagnoses   Name Primary?    Dysautonomia Yes    Unsteady gait     Imbalance        Referring Provider: Jinny Hernandez NP    Physician Orders: eval and treat; [facility]  Medical Diagnosis: Dysautonomia [G90.1], Unsteady gait [R26.81], Imbalance [R26.89]   Evaluation Date: 12/9/2024  Insurance Authorization Period Expiration: 12/31/2024  Plan of Care Certification Period: 2/3/2025   Visit # / Visits authorized: 1/1  FOTO completed: 1/3    Precautions:  Standard, fall, POTS/dysautonomina    Time In:1330  Time Out: 1430  Total Appointment Time (timed & untimed codes): 60 minutes    Subjective   History of current condition, based on chart review and Travon's report:   He began experiencing symptoms of dizziness in August while in [place], [place]. He initially had treatment there with limited relief. He tried for a year to find a specialist for treatment. He was evaluated by the [facility] in 2021. He was diagnosed with Dysautonomia and POTS at that time. He began receiving IVIG infusions and has noted benefit. This has gradually improved his symptoms. He was previously experiencing constant dizziness. He now has sporadic bouts of this. In September, he was reevaluated by [facility]'s Dysautonomia clinic. His testing results showed normal ranges. They recommend continuing infusion. He has established care with Dr. Alcantara through . He will continue to receive IVIG every 3 weeks. He does note frequent falls. He has fallen twice in the last month. He also notes fatigue. He will need frequent breaks. He cannot tolerate significant heat. He will not play golf in the summer. He was recently found to be hyperreflexic. He did have a cervical MRI done outside of [facility]. He was evaluated by Dr. Castanon in  "Neurosurgery who is obtaining further work up for potential surgery on "arthritic neck." He also has upcoming apt to evaluate B shoulder 2* concern for rotator cuff injury or arthritic pain. Of note, he does have a positive GREG.      From referring provider's note:   1. Dysautonomia:  Reviewed records in detail.  Per his last evaluation, his POTS seems to have improved, likely due to the effect of IVIG.  He has weaned off Droxidopa and his noticed no difference in his symptoms.  Today, we can discontinue Mestinon to see if this makes a difference.  We will plan on continuing the infusions as he feels this has been the most beneficial for his GREG positive/GM2 positive IVIG responsive symptoms. I also counseled the patient that moving forward, he needs to transition away from the sick role to allow him to live more normally. To this end, I will place a referral to the [facility] functional restoration program.  If he feels comfortable, he can discontinue wearing his mask as I do not find any evidence of immune compromise.  If anything, IVIG helps boost his immune system.     2. Cervical myelopathy:  Patient was relatively hyperreflexic, particularly for someone with neuropathy.  MRI of the neck is necessary.  He reports a prior MRI of the neck showing arthritic changes.  Will repeat to ensure that he does not have any cervical cord compression, which may be contributing to his unsteady gait     3. Possible small fiber neuropathy: Patient does have some evidence of subjective pinprick loss.  B12 is previously normal.  He is not diabetic or prediabetic.     4. Dizziness: Likely multifactorial.  Hopefully his symptoms will improve with PT/OT     RTC 3 mos, sooner if problem    Brooks Statement: "I've had some frightening falls" "I like the idea of fatigue management" also shared about potential upcoming move, that would limit availability to participate in therapy at this time.  "I don't know if there is any therapy " "that is going to take away my fear. And I've done my fair share of therapies" re: fear of falling.    He golfs frequently and goes to the gym weekly. "I feel like a dish rag by the end of a round of golf, but I'm not going to stay away from it. "Fear of uncertainty was the worst part of this."     Interested in energy management, fatigue management, pacing and scheduling throughout the day. Also states that he is not interested in group setting of FRP, and prefers 1:1 sessions. States that would be interested in improving balance with RW. Primary goals being Fatigue management, fear of falling, LB dressing/improved ADL performance.     Occupational Profile  Social and family Hx: , 30 years (2nd wife)   2 children, she has 3 children. Aussie-doodle, difficulty taking her on walks  Home access: 2 STH, master is on 2nd floor, walk in shower.    Planning a move with master on 1st floor.  DME: RW for past 4 years 2* balance issues  Driving status: some neck discomfort but no complaints  Occupations/hobbies/homemaking: golfing, walking dog, enjoys going out to dinner. Limits public social outings, but doesn't feel like outings are retricted unless heat or high physicality is demanded.   Working presently: Retired Medical/Commercial Floor Contractor     Prior Level of Function: Activities patient can no longer perform/has great difficulty with include: washing back, LB dressing, golf playing, completing more than 1 major task in a day without "paying for it later."     Prior Therapy: Last PT in 2022, Also completed vestibular OT in 2022  Current Exercise Routine: gym 2-3 days a week (45 min on track, and 30 min in weight room), Also plays golf on Wednesdays and Fridays   Current Self-care Routine: limited   Falls: x1/past month, endorses that they are "quick" onset, states that they are not typically a result of dizziness.   Disturbed sleep: Yes sleep apnea; had "inspire device" installed past week.  " "    Pain:      Endorses intermittent neck pain and shoulder, currently at 0/10.   Location: neck and B shoulder     Balance/Fatigue Issue  "The room doesn't move, I do"   Description: increased balance issues/falls  Functional Exacerbations: heat intolerant, extended exertion  Easing Factors: rest, IVIG, exercise    Patient Specific Activities based on Personal goals:  Activity  (To be rated first session after eval)    Performance Satisfaction   golf     2.  Cleaning back     3.  LB dressing     4.  packing     5.  Shaving the back of head          Total Score     Ratin-10; unable/unsatisfied - Able/Satisfied    Medical History:   Past Medical History:   Diagnosis Date    CKD (chronic kidney disease), stage II     General anesthetics causing adverse effect in therapeutic use     slow to arouse after sleep endoscopy    Hyperlipidemia     Hypertension     Lung nodule     POTS (postural orthostatic tachycardia syndrome)     2024 testing at [facility] shows no POTS dx    RBD (REM behavioral disorder)     Sensory ataxia     Sleep apnea     no CPAP    Small fiber neuropathy     Thyroid disease     Hashimoto's        Surgical History:   Brooks  has a past surgical history that includes Cardiac catheterization; Patent foramen ovale closure; Joint replacement (Bilateral); Tonsillectomy; Eye surgery (Bilateral); Colonoscopy; Total knee arthroplasty (Left); knuckle replacement surgery; sleep endoscopy, drug-induced (N/A, 2024); and insertion, neurostimulator, hypoglossal (N/A, 2024).    Medications:   Brooks has a current medication list which includes the following prescription(s): allopurinol, aspirin, atorvastatin, clonazepam, coenzyme q10, diazepam, finasteride, hydrocodone-acetaminophen, lactobacillus rhamnosus gg, levothyroxine, omega 3-dha-epa-fish oil, ondansetron, potassium citrate, prednisone, pyridostigmine, and sertraline.    Allergies:   Review of patient's allergies indicates:   Allergen Reactions    " Sulfa (sulfonamide antibiotics) Hives       Objective     COGNITIVE Exam  Behaviors: engaged and pleasant  Memory: No Deficits noted  Safety awareness/insight to disability: normal insight and judgment  Coping skills/emotional control: active coping strategies and focus on alternative activites; during session Appropriate to situation.    Dominant hand: right    Active Range of Motion  Upper Extremity 12/9/2024    RUE LUE Comments   Hands WFL WFL    Wrist WFL WFL    Elbows WFL WFL    Shoulders Minimally Limited Minimally Limited B shoulder rotator cuff      Upper Extremity Strength - Manual Muscle Testing  Motion Tested 12/9/2024    Right Left  comments   Shoulder Flexion 5/5 5/5    Shoulder Extension 5/5 5/5    Shoulder Abduction 4/5 4/5 Pain in R/L shoulder   Shoulder IR 5/5 5/5    Shoulder ER 5/5 5/5    Elbow Flexion 5/5 5/5    Elbow Extension 5/5 5/5       Strength (in pounds, rung II, average of three trials)   12/9/2024   Right hand  57.3 lbs   Left hand  54 lbs   [norms for men aged 75+: R=65.7 +/-21.0; L=55.0 +/-17.0 (Mary Grace et al, 1985)]     Functional Strength  Pile Testing: Lifting   12/9/2024 (lbs/HR/JIN)    Repetitive Floor to Waist      NP 2* time restraint    Repetitive Waist to Shoulder NP   1- Time Maximum   NP   Carry-2 Handed (40ft)  NP   Work demand Level  -   Reason for stop point -   Comments    The work demand level listed above is based on the physical performance screening test performed. More comprehensive physical testing integrated with clinical findings would be required to derived an accurate work capacity.     Balance  5 times sit-stand (adults 18-63 y/o) 12/9/2024   >12 sec= fall risk for general elderly  >16 sec= fall risk for Parkinson's disease  >10 sec= balance/vestibular dysfunction (<59 y/o)  >14.2 sec= balance/vestibular dysfunction (>59 y/o)  >12 sec= fall risk for CVA 11.99 seconds    (with  and without UE used to push up from chair)   Comments Endorsed pushing  through shoulders caused pain.     Endurance Testing: Modified Ramp Treadmill Test   12/9/2024   Minutes Completed  4 min   % Grades  10 %   Speed (mph)  2.1 mph   METS 5   HR 97 bpm   Miguel Rating Perceived Exertion Scale   4   Reason for stop point MIGUEL scale rating beyond recommended levels, Inability to maintain proper body mechanics, Decline in maintaining pace safely    Comments -     During physical testing, participation level was consistent.     Limitation/Restriction for FOTO NOC Survey    Therapist reviewed FOTO scores for Brooks Joya on 12/9/2024.   FOTO documents entered into [employer] - see Media section.    Limitation Score: 23%           Treatment   In addition to eval, Mr. Joya received the following treatments:    Dynamic functional therapeutic activities to improve functional performance for 30 minutes, including:  Education provided:   - proper posture and body mechanics.  - anticipated muscular soreness following lift testing and strategies for management including stretching, using ice, scheduled rest.  - Functional pain scale  - MIGUEL rate of perceived exertion scale.   - pain cycle  - pursed lip and diaphragmatic breathing techniques  - details of the Functional Restoration Program.     Written Home Exercises Provided: yes.Travon given handout re: miguel scale, pain cycle, z-lie, functional lifting mechanics, pursed lip and diaphragmatic breathing techniques.    Exercises were reviewed and Travon was able to demonstrate them prior to the end of the session. Travon demonstrated good  understanding of the education provided.     Educational materials can be found under patient instructions in the EMR.     No environmental, cultural, spiritual, developmental or education needs expressed or noted during evaluation today.     Assessment     Travon appears to be a good candidate for the Functional Restoration clinic. Travon would benefit from Occupational Therapy intervention to address deficits. He  experiences decreased independence with activities of roles of life, and appears open to education of how goal of chronic pain education program is to provide tools, education and training aimed at improving physical functioning, emotional health, stress and pain coping skills, to build confidence in physical activity and improve independence with ADLs and IADLs, and in the ability to control and manage symptoms. Travon did voice understanding that behavioral changes are needed to reach goals. Travon with pleasant, open affect and normal mood. He was able to participate in all aspects of assessment, although endorsed concerns about effiacy of potential therapeutic treatments 2* lack of success with PT/OT in the past to address balance and energy goals.  He was open to education regarding use of JIN scale to guide pacing. Due to the chronic nature of his issues and various co morbidities, he presents with an unstable clinical presentation which may limit his progress, but with adequate motivation to make behavioral changes.     Travon's prognosis is Fair due to chronicity of condition.    Travon is unable to fully participate in his work, recreational, and home-based activities because of decreased functional strength, decreased  AROM, decreased endurance, limited positional tolerance, fear of re-injury, and fear of increased fatigue. He will benefit from skilled outpatient Occupational Therapy to address the limitations and goals stated above and in the chart below, provide patient/family education, and to maximize patient's level of functional independence and meet functional goals.     Plan of care discussed with patient: Yes  Pt's spiritual, cultural and educational needs considered and patient is agreeable to the plan of care and goals as stated below:     Medical necessity is demonstrated by the following problem list:  Profile and History Assessment of Occupational Performance Level of Clinical Decision  Making Complexity Score   Occupational Profile:   Brooks Joya is a 79 y.o. male who lives with their spouse and is retired.Brooks Joya has difficulty with  ADLs and IADLs as listed previously, which  affecting his daily functional abilities. His main goal for therapy is improved pain management for completion of ADLs/IADLs.    Comorbidities:    has a past medical history of CKD (chronic kidney disease), stage II, General anesthetics causing adverse effect in therapeutic use, Hyperlipidemia, Hypertension, Lung nodule, POTS (postural orthostatic tachycardia syndrome), RBD (REM behavioral disorder), Sensory ataxia, Sleep apnea, Small fiber neuropathy, and Thyroid disease.    Medical and Therapy History Review:   Extensive Performance Deficits    Physical:  Joint Mobility  Muscle Power/Strength  Muscle Endurance   Strength  Pinch Strength  Proprioception Functions  Postural Control  Pain    Cognitive:  Safety Awareness/Insight to Disability  Emotional Control    Psychosocial:   Pain avoidance, social isolation   Social Interaction  Habits  Routines  Rituals  Family Support  Group Participation Clinical Decision Making:  moderate    Assessment Process:  Comprehensive Assessments    Modification/Need for Assistance:  Minimal-Moderate Modifications/Assistance    Intervention Selection:  Multiple Treatment Options       high  Based on PMHX, co morbidities , data from assessments and functional level of assistance required with task and clinical presentation directly impacting function.           Goals:    SHORT Term goals: In 3 weeks, patient will:  Status of goal:   Implements correct use of breathing techniques during functional lifting tasks, with minimal cues needed. Initiated   Utilizes JIN rate of exertion scale to pace performance with functional lifting tasks, and implements self-care strategies during activity participation to manage pain. Initiated   Verbalize understanding of energy conservation  and pacing strategies during daily habits, roles, and routines in order to improve tolerance for work and home demands.  Initiated   Completes 5x sit to stand test in 11 seconds or less to improve ability to participate in community mobility.   Initiated   Demonstrate increased positional tolerance to the level needed for work, home, and community activities (standing in cue at social event, managing supplies for outing, streneous IADL), as evidenced by having a METS level of 6. Initiated   Demonstrate proper body mechanics with functional lifting tasks (floor to waist, waist to shoulder, maximum lift, and box carry), via teach back method with minimal cues needed. Initiated   Demonstrate increased functional strength by lifting 10 lbs waist to shoulder for management of (I)ADL, including dressing and grooming, placing items in kitchen cabinets, folding towels, and/or managing suitcase when traveling.   Initiated   Demonstrate increased functional strength by lifting 10 lbs floor to waist to meet demand of work/home routine, including lifting groceries, managing laundry, and/or managing suitcase when traveling.   Initiated   Tolerate Home Activity Plan (HAP)/ Home Exercise Program (HEP) to promote safety and independence with ADL, with report of completion of at least 2 out of 4 days outside of program participation.  Initiated   Show improved perception of own abilities as evidenced by increase of FOTO scores to established MDC value and perception of performance ratings regarding personal long term goals.  Initiated       Long Term goals: In 9 weeks, patient will: Status of goal:   Report implementation of application of mindfulness, stretching, and other coping strategies for improved participation in daily routine. Initiated   Verbalize functional application of lifting techniques in daily life (golfers lift, floor to waist, waist to shoulder). Initiated   Applies functional application of lifting techniques  (golfer's lift, floor to waist, waist to shoulder) in activities of daily life, per patient report. Initiated   Demonstrate physical capacities consistent with a Sedentary-Light (#15 max, frequent lifting/carrying #10, 2.0 METS)  demand level, as needed to perform activities to be successful in roles of life, regarding Household Management and Community Participation. Initiated   Have an improvement of 3 points in 2/5 personal goals in rating of  performance.  Initiated   Show improved perception of own abilities as evidenced by increase of FOTO scores to established MC II value and perception of performance ratings regarding personal long term goals.  Initiated     Patient Specific Goals; to be met after 2 months of (I) application of tools/techniques learned.  Vocational: complete packing and moving with good management of energy.fatigue   Recreational : return to golf and gym with less guarding of energy resources    Daily Living Activities: LB dressing, washing back    Measured by patient's self-reported performance and satisfaction of personal FRP goals, listed above in subjective section.   Total Score = sum of the activity performance scores / number of activities  Minimum detectable change (90%CI) for average score = 2 points  Minimum detectable change (90%CI) for single activity score  = 3 points     Plan   Plan of care certification: 12/9/2024 to 2/9/2025    Outpatient occupational therapy, 2 times a week for 8 weeks to include the following interventions: Patient Education, Self- Care/Home-management strategies, Therapeutic Activities, Therapeutic Exercise and Therapeutic interventions that focus on cognitive function and compensatory strategies to manage the performance activities, followed by independent application of FRP tools and techniques learned, to work towards improved performance regarding personal goals with return 2 month post cohort completion for taking of measures and review of plan of  care..    Amelia Moses, OT, NAYE, 12/9/2024  Occupational Therapy

## 2024-12-12 ENCOUNTER — DOCUMENTATION ONLY (OUTPATIENT)
Dept: REHABILITATION | Facility: OTHER | Age: 79
End: 2024-12-12
Payer: MEDICARE

## 2024-12-12 NOTE — PROGRESS NOTES
PHYSICAL THERAPY DISCHARGE:    This patient was originally evaluated at our facility on: 12/9/24         Pt attended PT for a total of 1 Visits receiving: Manual Therapy, Therex, Postural Education, Body Mechanics Training, Home Exercise Instruction     This patient's last visit occurred on: 12/9/24      Short term goals were achieved: no    Long term goals were achieved: no    Pt was DC'd from our care secondary to: Called and spoke with patient, he acquired new home and will be busy with inspections and renovations for the following 3-4 months. After that, he will likely undergo shoulder replacement procedures for bilateral shoulders (one at a time), and possibly need a neurosurgical procedure during this time. He request deferring care until after these tasks/care episodes are over.       Therapist: Kash Arboleda, PT  12/12/2024

## 2024-12-12 NOTE — PROGRESS NOTES
Called and spoke with patient, he acquired new home and will be busy with inspections and renovations for the following 3-4 months. After that, he will likely undergo shoulder replacement procedures for bilateral shoulders (one at a time), and possibly need a neurosurgical procedure during this time. He request deferring care until after these tasks/care episodes are over. Will discharge in separate note.    Kash Arboleda PT, DPT

## 2024-12-12 NOTE — PLAN OF CARE
[facility]  Functional Restoration Program  Physical Therapy Initial Evaluation    Date: 12/9/2024   Name: Brooks Joya  Clinic Number: [id number]    Therapy Diagnosis:   Encounter Diagnoses   Name Primary?    Dysautonomia     Unsteady gait     Imbalance     Impaired functional mobility, balance, gait, and endurance Yes     Physician: Jinny Hernandez, NP    Physician Orders: PT Eval and Treat  Medical Diagnosis from Referral:   Diagnosis   G90.1 (ICD-10-CM) - Dysautonomia   R26.81 (ICD-10-CM) - Unsteady gait   R26.89 (ICD-10-CM) - Imbalance     Evaluation Date: 12/9/2024  Authorization Period Expiration: 12/2/25  Plan of Care Expiration: 4/30/25  Visit # / Visits authorized: 1/ 1 (Needs 6MWT)  FOTO: 1/ 3     Precautions: Standard and Fall and Immunocompromise    Time In: 2:30pm  Time Out: 3:40pm  Total Appointment Time (timed & untimed codes): 70 minutes      Subjective     Date of onset: 2020    Patient reported history of current condition:     From referring provider's note:   1. Dysautonomia:  Reviewed records in detail.  Per his last evaluation, his POTS seems to have improved, likely due to the effect of IVIG.  He has weaned off Droxidopa and his noticed no difference in his symptoms.  Today, we can discontinue Mestinon to see if this makes a difference.  We will plan on continuing the infusions as he feels this has been the most beneficial for his GREG positive/GM2 positive IVIG responsive symptoms. I also counseled the patient that moving forward, he needs to transition away from the sick role to allow him to live more normally. To this end, I will place a referral to the [facility] functional restoration program.  If he feels comfortable, he can discontinue wearing his mask as I do not find any evidence of immune compromise.  If anything, IVIG helps boost his immune system.     2. Cervical myelopathy:  Patient was relatively hyperreflexic, particularly for someone with neuropathy.   MRI of the neck is necessary.  He reports a prior MRI of the neck showing arthritic changes.  Will repeat to ensure that he does not have any cervical cord compression, which may be contributing to his unsteady gait     3. Possible small fiber neuropathy: Patient does have some evidence of subjective pinprick loss.  B12 is previously normal.  He is not diabetic or prediabetic.     4. Dizziness: Likely multifactorial.  Hopefully his symptoms will improve with PT/OT     RTC 3 mos, sooner if problem     From Jinny Hernandez:  He began experiencing symptoms of dizziness in August while in [place], [place]. He initially had treatment there with limited relief. He tried for a year to find a specialist for treatment. He was evaluated by the [facility] in 2021. He was diagnosed with Dysautonomia and POTS at that time. He began receiving IVIG infusions. This has gradually improved his symptoms. He was previously experiencing constant dizziness. He now has sporadic bouts of this. In September, he was reevaluated by [facility]'s Dysautonomia clinic. His testing results showed normal ranges. They recommend continuing infusion. He has established care with Dr. Alcantara through [facility]. He will continue to receive IVIG every 3 weeks. He does note frequent falls. He has fallen twice in the last month. He also notes fatigue. He will need frequent breaks. He cannot tolerate significant heat. He will not play golf in the summer. He was recently found to be hyperreflexic. He did have a cervical MRI done outside of [facility]. He was evaluated by Dr. Castanon in Neurosurgery who is obtaining further work up. Of note, he does have a positive GREG.     Travon reports: Retired in his late 60's/early 70's, was playing more golf, started going to [place] for the gill, had not child or money problems, then on 8/5/20 on golf course had sudden attack of motion imbalance and dizziness and heat intolerance that has continued since. Dizziness got better with IVIG  "treatment, but chronic fatigue and motion imbalance are most notable still. Falling frequently and endurance are an issue, hence he uses a rolling walker. He also reports Small Fiber Polyneuropathy dx from [facility] but was not given particular treatment for this. Has had PT for hips, knees, hands in past, and vestibular PT recently which did very little for him, per patient.    His current neurologist, Dr. Alcantara, recommended stopping dysautonomia drugs he was on and he hasn't noticed any difference since stopping them (happy to be off them). Also recommended getting rid of RW and golfing more, which he is happy about. Neurosurgeon thinks cord compression in C-spine may be cause of leg symptoms, has surgical consult soon.    He golfs frequently and goes to the gym weekly. "I feel like a dish rag by the end of a round of golf, but I'm not going to stay away from it.  "Fear of uncertainty was the worst part of this."    Imaging: see imaging section    Prior Therapy: see above  Social History: lives with their spouse  Occupation: retired  Prior Level of Function: no limitations  Current Level of Function: limited walking, standing, balance capacity    Pain:      Current 2/10, worst 5/10, best 0/10   Location: neck, bilateral shoulders    Patient's FRP goals:   Be able to golf regularly without falling  Prolong functionality  Reduce falls    Medical History:   Past Medical History:   Diagnosis Date    CKD (chronic kidney disease), stage II     General anesthetics causing adverse effect in therapeutic use     slow to arouse after sleep endoscopy    Hyperlipidemia     Hypertension     Lung nodule     POTS (postural orthostatic tachycardia syndrome)     8/2024 testing at [facility] shows no POTS dx    RBD (REM behavioral disorder)     Sensory ataxia     Sleep apnea     no CPAP    Small fiber neuropathy     Thyroid disease     Hashimoto's       Surgical History:   Brooks Joya  has a past surgical history that " includes Cardiac catheterization; Patent foramen ovale closure; Joint replacement (Bilateral); Tonsillectomy; Eye surgery (Bilateral); Colonoscopy; Total knee arthroplasty (Left); knuckle replacement surgery; sleep endoscopy, drug-induced (N/A, 6/12/2024); and insertion, neurostimulator, hypoglossal (N/A, 9/7/2024).    Medications:   Brooks has a current medication list which includes the following prescription(s): allopurinol, aspirin, atorvastatin, clonazepam, coenzyme q10, diazepam, finasteride, hydrocodone-acetaminophen, lactobacillus rhamnosus gg, levothyroxine, omega 3-dha-epa-fish oil, ondansetron, potassium citrate, prednisone, pyridostigmine, and sertraline.    Allergies:   Review of patient's allergies indicates:   Allergen Reactions    Sulfa (sulfonamide antibiotics) Hives        Objective     Postural examination:  Seated: Forward head  Standing: reduce lumbar lordosis    Range of Motion - Cervical   AROM AROM AROM    12/9/2024 Reassessment  Reassessment   Flexion      Extension      Side bending Right      Side bending Left      Rotation Right      Rotation Left        Range of Motion - Lumbar   12/9/2024      ROM Loss ROM Loss ROM Loss   Flexion      Extension      Side bending Right      Side bending Left      Rotation Right      Rotation Left        Strength Testing   12/9/2024 Reassessment Reassessment   Motion Tested         Lower Extremity R L R L R L   Hip Flexion 5/5 5/5       Hip Extension 4+/5 4+/5       Hip Abduction 4/5 4/5       Hip Adduction 4/5 4/5       Knee Extension 5/5 5/5       Knee Flexion 4+/5 4+/5       Ankle Dorsiflexion 5/5 5/5         Functional Testing     12/9/2024 Reassessment  Reassessment   Timed Up and Go 8.85 sec  8.37 sec sec sec   Single Limb Stance R LE 10.69 sec sec sec   Single Limb Stance L LE 5 sec sec sec   2 Minute Walk Test  meters feet feet   6 Minute Walk Test TBD meters feet feet   Self Selected Walking Speed TBD m/sec m/sec m/sec     GAIT:  Assistive  Device used: none and RW  Level of Assistance: independent  Patient displays the following gait deviations:  unsteady gait and occasional midline crossing (scissoring).     Minimum standards/norms:    TUG:  < 13.5 seconds/ Males 7.3 sec, Females 8.1 sec  SLS:  26-29 sec  Ages 20-69  Self Selected Walking Speed:  .4-.8m/sec  Limited community ambulator                                                   .8- 1.2  Community ambulator                                                    1.2 m/sec and above  Able to safely cross streets        Limitation/Restriction for FOTO Neck Survey    Therapist reviewed FOTO scores for Brooks Joya on 12/9/2024.   FOTO documents entered into [employer] - see Media section.    Limitation Score 12/9/2024: TBD%    Predicted Score: TBD%             TREATMENT     Total Treatment time (time-based codes) separate from Evaluation: 20 minutes     Travon received the treatments listed below:        Travon received therapeutic activities to improve functional performance for 20 minutes, including:  PT education:  Physical & psychological viscous pain cycles (see patient instructions 12/9/2024)  Role of consistent activity (graded exposure) to break the physical cycle  Importance of education & behavioral changes that promote intrinsic patient motivation to help break the psychological cycle  Impact on pt's functional goals when breaking each one of these cycles.    Impact central sensitization has on the sensory system in the chronic pain population  Autonomic NS Education  Reviewed sympathetic and parasympathetic systems  Reviewed neuroplasticity and central sensitization, and their roles in dysautonomia      PATIENT EDUCATION AND HOME EXERCISES     Education provided:   - FRP structure  - Autonomic nervous system    Written Home Exercises Provided: Patient instructed to cont prior HEP. Exercises were reviewed and Travon was able to demonstrate them prior to the end of the session.  Tarvon  demonstrated good  understanding of the education provided. See EMR under Patient Instructions for information provided during therapy sessions.    ASSESSMENT   Brooks is a 79 y.o. male referred to outpatient Physical Therapy with a medical diagnosis of dysautonomia, unsteady gait and imbalance. Patient presents today with impaired balance, stability with functional transfers (sit to stand), and impaired gait impacting his functional capacity for community navigation and activities of choice. He additionally presents with neck and shoulder pain impacting his quality of life and sleep. Based on the above history and physical examination an active physical therapy program within a multi-disciplinary setting is recommended.  Pt will benefit from skilled outpatient physical therapy to address the deficits listed below in the chart, provide pt/family education and to maximize pt's level of independence in the home and community environment.     Plan of care discussed with patient: Yes  Pt's spiritual, cultural and educational needs considered and patient is agreeable to the plan of care and goals as stated below:     Pt prognosis is Good.   Anticipated Barriers for therapy: chronic nature of issues, psychosocial factors, central sensitization    Medical Necessity is demonstrated by the following  History  Co-morbidities and personal factors that may impact the plan of care Co-morbidities:   See PMhx    Personal Factors:   coping style     high   Examination  Body Structures and Functions, activity limitations and participation restrictions that may impact the plan of care Body Regions:   neck  back  lower extremities  upper extremities    Body Systems:    balance  gait  transfers  motor control    Participation Restrictions:   None    Activity limitations:   Learning and applying knowledge  no deficits    General Tasks and Commands  no deficits    Communication  no deficits    Mobility  walking  using transportation  (bus, train, plane, car)  driving (bike, car, motorcycle)    Self care  washing oneself (bathing, drying, washing hands)  caring for body parts (brushing teeth, shaving, grooming)    Domestic Life  shopping  doing house work (cleaning house, washing dishes, laundry)  assisting others    Interactions/Relationships  no deficits    Life Areas  no deficits    Community and Social Life  no deficits         high   Clinical Presentation unstable clinical presentation with unpredictable characteristics high   Decision Making/ Complexity Score: high       GOALS: Pt is in agreement with the following goals:  Goal Progress Towards Goal   SHORT Term: In 3 weeks, pt will:    Verbalize & demonstrate good understanding of resisted training with 3-1-3 second rep for cktjibyjwb-eicpnkdhr-ettomiijl contraction to encourage full muscle recruitment for strength & endurance. Initiated 12/9/2024   Verbalize & demonstrate good understanding of home stretch routine to improve AROM, help decrease pain & improve mobility. Initiated 12/9/2024   Demonstrate proper supine or seated diaphragmatic breathing with decreased chest excursion & emphasis on full abdominal excursion & increased expiration time to promote muscle relaxation & increased parasympathetic activity to improve patient tolerance to activities. Initiated 12/9/2024   Verbalize good understanding of importance of proper posture in resisted exercise, functional activities & ADL's in order to help reduce postural strain, promote proper breathing. Initiated 12/9/2024   Demonstrate good understanding of full body resisted exercises w/ use of pictures &/or verbal cues to promote independence w/ exercise at the end of the program. Initiated 12/9/2024   Verbalize plan for continuing resisted exercises w/ specific location (i.e. commercial gym, home, community fitness center)  to incorporate all major muscle groups to maintain & progress therapeutic functional improvements. Initiated  "12/9/2024   Verbalize difference between  "hurt vs harm"  to demonstrate understanding of fear avoidance in pain cycle.  Initiated 12/9/2024       Midterm: In 8 weeks, pt will:    Verbalize good understanding of activities planning/scheduling (stretching, mindfulness, resisted training, & cardio) prescribed by PT/OT following the end of the program to sustain & progress functional improvements. Initiated 12/9/2024   Perform selected resisted training w/ minimal to no cuing in therapy session to be independent w/ resisted strengthening. Initiated 12/9/2024   Demonstrate improved symptom self-management w/ posture/positioning and mechanical movements and/or implementation of appropriate modalities throughout a typical day.  Initiated 12/9/2024   Demonstrate independence w/ home stretch routine to improve AROM, help decrease pain & improve mobility. Initiated 12/9/2024       Long Term: In 12 weeks, pt will:    Perform selected cardio & resisted training independently to continue safe regular performance of daily exercise. Initiated 12/9/2024   Improve 2 Minute Walk Test (MWT) to TBD meters and 6 MWT to TBD meters or greater to improve gait speed and mobility. Initiated 12/9/2024   Perform single leg stance 20 seconds or greater bilaterally to improve safety and balance in ADLs. Initiated 12/9/2024   Demonstrate Timed Up & Go (with appropriate assistive device, if necessary) of 8 seconds or less to decrease fall risk and improve functional mobility. Initiated 12/9/2024   Score TBD % or greater on Cervical FOTO to indicate significant functional improvements in impaired functions secondary to pain. Initiated 12/9/2024    Initiated 12/9/2024       PLAN   Plan of care Certification: 12/9/2024 to 4/30/25.    Outpatient Physical Therapy 3 times weekly for 12 weeks to include the following interventions: Gait Training, Manual Therapy, Moist Heat/ Ice, Neuromuscular Re-ed, Patient Education, Therapeutic Activities, and " Therapeutic Exercise. Patient may need to defer treatment pending acquisition of new home and/or need for neurosurgery and orthopedic surgery for shoulders in coming months.    Kash Arboleda, PT

## 2024-12-16 ENCOUNTER — DOCUMENTATION ONLY (OUTPATIENT)
Dept: REHABILITATION | Facility: OTHER | Age: 79
End: 2024-12-16
Payer: MEDICARE

## 2024-12-16 NOTE — PROGRESS NOTES
[facility]  Functional Restoration Clinic  Discharge Note    Name: Brooks Joya  Clinic Number: [id number]    Encounter Date: 12/9/2024  Name: Brooks Joya  Clinic Number: [id number]  Therapy Diagnosis:        Encounter Diagnoses   Name Primary?    Dysautonomia Yes    Unsteady gait      Imbalance      Date of Last visit: 12/9/2024  Total Visits Received: 1    ASSESSMENT      Pt was DC'd from our care secondary to: PT Called and spoke with patient, he acquired new home and will be busy with inspections and renovations for the following 3-4 months. After that, he will likely undergo shoulder replacement procedures for bilateral shoulders (one at a time), and possibly need a neurosurgical procedure during this time. He request deferring care until after these tasks/care episodes are over.     Discharge reason: self-discharge    Discharge FOTO Score:    Limitation/Restriction for FOTO NOC Survey     Therapist reviewed FOTO scores for Brooks Joya on 12/9/2024.   FOTO documents entered into [employer] - see Media section.     Limitation Score: 23%                Goals: Travon has not met any of his goals.     PLAN   This patient is discharged from Occupational Therapy      Amelia Moses, OT